# Patient Record
Sex: FEMALE | Race: WHITE | NOT HISPANIC OR LATINO | Employment: OTHER | URBAN - NONMETROPOLITAN AREA
[De-identification: names, ages, dates, MRNs, and addresses within clinical notes are randomized per-mention and may not be internally consistent; named-entity substitution may affect disease eponyms.]

---

## 2017-03-07 ENCOUNTER — OFFICE VISIT (OUTPATIENT)
Dept: URGENT CARE | Facility: PHYSICIAN GROUP | Age: 27
End: 2017-03-07
Payer: MEDICAID

## 2017-03-07 VITALS
OXYGEN SATURATION: 96 % | WEIGHT: 122 LBS | SYSTOLIC BLOOD PRESSURE: 110 MMHG | RESPIRATION RATE: 14 BRPM | HEIGHT: 68 IN | DIASTOLIC BLOOD PRESSURE: 70 MMHG | TEMPERATURE: 99 F | HEART RATE: 80 BPM | BODY MASS INDEX: 18.49 KG/M2

## 2017-03-07 DIAGNOSIS — R10.9 FLANK PAIN: ICD-10-CM

## 2017-03-07 DIAGNOSIS — N23 RENAL COLIC ON LEFT SIDE: ICD-10-CM

## 2017-03-07 LAB
APPEARANCE UR: NORMAL
BILIRUB UR STRIP-MCNC: NORMAL MG/DL
COLOR UR AUTO: NORMAL
GLUCOSE UR STRIP.AUTO-MCNC: NORMAL MG/DL
INT CON NEG: NEGATIVE
INT CON POS: POSITIVE
KETONES UR STRIP.AUTO-MCNC: NORMAL MG/DL
LEUKOCYTE ESTERASE UR QL STRIP.AUTO: NORMAL
NITRITE UR QL STRIP.AUTO: NORMAL
PH UR STRIP.AUTO: 8 [PH] (ref 5–8)
POC URINE PREGNANCY TEST: NEGATIVE
PROT UR QL STRIP: NORMAL MG/DL
RBC UR QL AUTO: NORMAL
SP GR UR STRIP.AUTO: 1
UROBILINOGEN UR STRIP-MCNC: NORMAL MG/DL

## 2017-03-07 PROCEDURE — 99203 OFFICE O/P NEW LOW 30 MIN: CPT | Performed by: PHYSICIAN ASSISTANT

## 2017-03-07 PROCEDURE — 81002 URINALYSIS NONAUTO W/O SCOPE: CPT | Performed by: PHYSICIAN ASSISTANT

## 2017-03-07 PROCEDURE — 81025 URINE PREGNANCY TEST: CPT | Performed by: PHYSICIAN ASSISTANT

## 2017-03-07 RX ORDER — TAMSULOSIN HYDROCHLORIDE 0.4 MG/1
0.4 CAPSULE ORAL
Qty: 20 CAP | Refills: 0 | Status: SHIPPED | OUTPATIENT
Start: 2017-03-07 | End: 2018-12-27

## 2017-03-07 RX ORDER — DICLOFENAC SODIUM 75 MG/1
75 TABLET, DELAYED RELEASE ORAL 2 TIMES DAILY
Qty: 60 TAB | Refills: 0 | Status: SHIPPED | OUTPATIENT
Start: 2017-03-07 | End: 2018-12-27

## 2017-03-07 RX ORDER — PHENAZOPYRIDINE HYDROCHLORIDE 200 MG/1
200 TABLET, FILM COATED ORAL 3 TIMES DAILY PRN
Qty: 6 TAB | Refills: 0 | Status: SHIPPED | OUTPATIENT
Start: 2017-03-07 | End: 2018-12-27

## 2017-03-08 ENCOUNTER — OFFICE VISIT (OUTPATIENT)
Dept: URGENT CARE | Facility: PHYSICIAN GROUP | Age: 27
End: 2017-03-08
Payer: MEDICAID

## 2017-03-08 VITALS
SYSTOLIC BLOOD PRESSURE: 116 MMHG | HEART RATE: 88 BPM | HEIGHT: 68 IN | RESPIRATION RATE: 14 BRPM | WEIGHT: 122 LBS | DIASTOLIC BLOOD PRESSURE: 74 MMHG | OXYGEN SATURATION: 98 % | TEMPERATURE: 97.6 F | BODY MASS INDEX: 18.49 KG/M2

## 2017-03-08 DIAGNOSIS — R11.0 NAUSEA: ICD-10-CM

## 2017-03-08 DIAGNOSIS — R31.9 HEMATURIA: ICD-10-CM

## 2017-03-08 DIAGNOSIS — Z87.442 HISTORY OF RENAL STONE: ICD-10-CM

## 2017-03-08 DIAGNOSIS — R10.9 LEFT FLANK PAIN: ICD-10-CM

## 2017-03-08 PROCEDURE — 99214 OFFICE O/P EST MOD 30 MIN: CPT | Performed by: PHYSICIAN ASSISTANT

## 2017-03-08 RX ORDER — OXYCODONE HYDROCHLORIDE AND ACETAMINOPHEN 5; 325 MG/1; MG/1
1 TABLET ORAL EVERY 6 HOURS PRN
Qty: 15 TAB | Refills: 0 | Status: SHIPPED | OUTPATIENT
Start: 2017-03-08 | End: 2017-03-23

## 2017-03-08 RX ORDER — ONDANSETRON 4 MG/1
4 TABLET, ORALLY DISINTEGRATING ORAL EVERY 8 HOURS PRN
Qty: 20 TAB | Refills: 0 | Status: SHIPPED | OUTPATIENT
Start: 2017-03-08 | End: 2018-12-27

## 2017-03-08 NOTE — MR AVS SNAPSHOT
"        Lucy Gonzalez   3/8/2017 6:15 PM   Office Visit   MRN: [id number]    Department:  [facility]   Dept Phone:  [phone]    Description:  Female : 1990   Provider:  Crystal Flores PA-C           Reason for Visit     Flank Pain           Allergies as of 3/8/2017     Allergen Noted Reactions    Bactrim Ds 2017         You were diagnosed with     Left flank pain   [763200]       Hematuria   [3779597]       History of renal stone   [190158]       Nausea   [955574]         Vital Signs     Blood Pressure Pulse Temperature Respirations Height Weight    116/74 mmHg 88 36.4 °C (97.6 °F) 14 1.727 m (5' 8\") 55.339 kg (122 lb)    Body Mass Index Oxygen Saturation Smoking Status             18.55 kg/m2 98% Never Smoker          Basic Information     Date Of Birth Sex Race Ethnicity Preferred Language    1990 Female Unable to Obtain Unknown English      Health Maintenance        Date Due Completion Dates    IMM HEP B VACCINE (1 of 3 - Primary Series) 1990 ---    IMM HEP A VACCINE (1 of 2 - Standard Series) 3/18/1992 ---    IMM HPV VACCINE (1 of 3 - Female 3 Dose Series) 3/18/2002 ---    IMM VARICELLA (CHICKENPOX) VACCINE (1 of 2 - 2 Dose Adolescent Series) 3/18/2004 ---    IMM DTaP/Tdap/Td Vaccine (1 - Tdap) 3/18/2010 ---    PAP SMEAR 3/18/2012 ---    IMM INFLUENZA (1) 2016 ---            Current Immunizations     No immunizations on file.      Below and/or attached are the medications your provider expects you to take. Review all of your home medications and newly ordered medications with your provider and/or pharmacist. Follow medication instructions as directed by your provider and/or pharmacist. Please keep your medication list with you and share with your provider. Update the information when medications are discontinued, doses are changed, or new medications (including over-the-counter products) are added; and carry medication information at all times in the event of emergency " situations     Allergies:  BACTRIM DS - (reactions not documented)               Medications  Valid as of: March 08, 2017 -  6:43 PM    Generic Name Brand Name Tablet Size Instructions for use    Diclofenac Sodium (Tablet Delayed Response) VOLTAREN 75 MG Take 1 Tab by mouth 2 times a day.        Ondansetron (TABLET DISPERSIBLE) ZOFRAN ODT 4 MG Take 1 Tab by mouth every 8 hours as needed for Nausea/Vomiting.        Oxycodone-Acetaminophen (Tab) PERCOCET 5-325 MG Take 1 Tab by mouth every 6 hours as needed for Moderate Pain or Severe Pain.        Phenazopyridine HCl (Tab) PYRIDIUM 200 MG Take 1 Tab by mouth 3 times a day as needed.        Tamsulosin HCl (Cap) FLOMAX 0.4 MG Take 1 Cap by mouth ONE-HALF HOUR AFTER BREAKFAST.        .                 Medicines prescribed today were sent to:     [facility] [id number] [place] [address], [place] - [address] [facility] [address] [place] [address] & [place]    [address] [facility] [address]    Phone: [phone] Fax: [phone]    Open 24 Hours?: No      Medication refill instructions:       If your prescription bottle indicates you have medication refills left, it is not necessary to call your provider’s office. Please contact your pharmacy and they will refill your medication.    If your prescription bottle indicates you do not have any refills left, you may request refills at any time through one of the following ways: The online [employer] system (except Urgent Care), by calling your provider’s office, or by asking your pharmacy to contact your provider’s office with a refill request. Medication refills are processed only during regular business hours and may not be available until the next business day. Your provider may request additional information or to have a follow-up visit with you prior to refilling your medication.   *Please Note: Medication refills are assigned a new Rx number when refilled electronically. Your pharmacy may indicate that no refills were  authorized even though a new prescription for the same medication is available at the pharmacy. Please request the medicine by name with the pharmacy before contacting your provider for a refill.        Your To Do List     Future Labs/Procedures Complete By Expires    CT-ABDOMEN-PELVIS W/O  As directed 3/8/2018      Referral     A referral request has been sent to our patient care coordination department. Please allow 3-5 business days for us to process this request and contact you either by phone or mail. If you do not hear from us by the 5th business day, please call us at [phone].           Topicmarks Access Code: [email or web address]  Expires: 4/7/2017  6:43 PM    Your email address is not on file at [facility].  Email Addresses are required for you to sign up for Topicmarks, please contact [phone] to verify your personal information and to provide your email address prior to attempting to register for [place] [address]    Topicmarks  A secure, online tool to manage your health information     [facility]’s Topicmarks® is a secure, online tool that connects you to your personalized health information from the privacy of your home -- day or night - making it very easy for you to manage your healthcare. Once the activation process is completed, you can even access your medical information using the Topicmarks stepan, which is available for free in the Apple Stepan store or Google Play store.     To learn more about Topicmarks, visit [email or web address]    There are two levels of access available (as shown below):   My Chart Features  [facility] Primary Care Doctor [facility]  Specialists [facility]  Urgent  Care Non-[facility] Primary Care Doctor   Email your healthcare team securely and privately 24/7 X X X    Manage appointments: schedule your next appointment; view details of past/upcoming appointments X      Request prescription refills. X      View recent personal medical records, including  lab and immunizations X X X X   View health record, including health history, allergies, medications X X X X   Read reports about your outpatient visits, procedures, consult and ER notes X X X X   See your discharge summary, which is a recap of your hospital and/or ER visit that includes your diagnosis, lab results, and care plan X X  X     How to register for [employer]:  Once your e-mail address has been verified, follow the following steps to sign up for [employer].     1. Go to  [email or web address]  2. Click on the Sign Up Now box, which takes you to the New Member Sign Up page. You will need to provide the following information:  a. Enter your [employer] Access Code exactly as it appears at the top of this page. (You will not need to use this code after you’ve completed the sign-up process. If you do not sign up before the expiration date, you must request a new code.)   b. Enter your date of birth.   c. Enter your home email address.   d. Click Submit, and follow the next screen’s instructions.  3. Create a BiOWiSHt ID. This will be your Cinedigm login ID and cannot be changed, so think of one that is secure and easy to remember.  4. Create a [employer]inedigm password. You can change your password at any time.  5. Enter your Password Reset Question and Answer. This can be used at a later time if you forget your password.   6. Enter your e-mail address. This allows you to receive e-mail notifications when new information is available in [employer]inedigm.  7. Click Sign Up. You can now view your health information.    For assistance activating your [employer] account, call [phone]

## 2017-03-08 NOTE — PROGRESS NOTES
Chief Complaint   Patient presents with   • Dysuria     possible stone       HISTORY OF PRESENT ILLNESS: Patient is a 26 y.o. female who presents today because she has left flank pain that has been going on for several days. She has a history of stones, passed a stone today that gave her a lot of pain. She has the stone in a Ziploc bag. Unless she denies any fevers, chills, nausea, vomiting or diarrhea. She has been taking some Tylenol for her symptoms, and that is all. She states this feels like her bladder is having spasms    There are no active problems to display for this patient.      Allergies:Bactrim ds    Current Outpatient Prescriptions Ordered in [facility]   Medication Sig Dispense Refill   • tamsulosin (FLOMAX) 0.4 MG capsule Take 1 Cap by mouth ONE-HALF HOUR AFTER BREAKFAST. 20 Cap 0   • diclofenac EC (VOLTAREN) 75 MG Tablet Delayed Response Take 1 Tab by mouth 2 times a day. 60 Tab 0   • phenazopyridine (PYRIDIUM) 200 MG Tab Take 1 Tab by mouth 3 times a day as needed. 6 Tab 0     No current Epic-ordered facility-administered medications on file.       No past medical history on file.    Social History   Substance Use Topics   • Smoking status: Never Smoker    • Smokeless tobacco: None   • Alcohol Use: None       No family status information on file.   No family history on file.    ROS:  Review of Systems   Constitutional: Negative for fever, chills, weight loss and malaise/fatigue.   HENT: Negative for ear pain, nosebleeds, congestion, sore throat and neck pain.    Eyes: Negative for blurred vision.   Respiratory: Negative for cough, sputum production, shortness of breath and wheezing.    Cardiovascular: Negative for chest pain, palpitations, orthopnea and leg swelling.   Gastrointestinal: Negative for heartburn, nausea, vomiting and positive for left flank and lower abdominal pain.   Genitourinary: Negative for dysuria, urgency and frequency.     Exam:  Blood pressure 110/70, pulse 80, temperature 37.2 °C  "(99 °F), resp. rate 14, height 1.727 m (5' 8\"), weight 55.339 kg (122 lb), SpO2 96 %.  General:  Well nourished, well developed female in NAD  Head:Normocephalic, atraumatic  Eyes: PERRLA, EOM within normal limits, no conjunctival injection, no scleral icterus, visual fields and acuity grossly intact.  Abdomen: Nondistended, bowel tones in all 4 quadrants, soft, she has some suprapubic tenderness to palpation, also tenderness to palpation in the left CVA area. No organomegaly, rebound, referred rebound tenderness.  Extremities: no clubbing, cyanosis, or edema.    Urinalysis shows a moderate amount of nonhemolyzed blood    HCG negative    Please note that this dictation was created using voice recognition software. I have made every reasonable attempt to correct obvious errors, but I expect that there are errors of grammar and possibly content that I did not discover before finalizing the note.    Assessment/Plan:  1. Flank pain  POCT Urinalysis   2. Renal colic on left side  POCT PREGNANCY    tamsulosin (FLOMAX) 0.4 MG capsule    diclofenac EC (VOLTAREN) 75 MG Tablet Delayed Response    phenazopyridine (PYRIDIUM) 200 MG Tab    , increase by mouth fluids    Followup with primary care in the next 7-10 days if not significantly improving, return to the urgent care or go to the emergency room sooner for any worsening of symptoms.         "

## 2017-03-09 NOTE — PROGRESS NOTES
Chief Complaint   Patient presents with   • Flank Pain       HISTORY OF PRESENT ILLNESS: Patient is a 26 y.o. female who presents today for worsening left flank pain. Patient was seen yesterday for the same. She had gross hematuria in her urine without any evidence of infection. She was put on diclofenac, Pyridium, and Flomax. She states that earlier today she had severe left flank pain that dropped her to her knees with associated nausea and vomiting. She has a history of renal stones and was seeing urology in [place] before relocating here. She reports persistent dysuria and left flank pain. She does not have a plan with her primary care provider until the end of the month. The medication that she was given is not helping.    There are no active problems to display for this patient.      Allergies:Bactrim ds    Current Outpatient Prescriptions Ordered in [facility]   Medication Sig Dispense Refill   • oxycodone-acetaminophen (PERCOCET) 5-325 MG Tab Take 1 Tab by mouth every 6 hours as needed for Moderate Pain or Severe Pain. 15 Tab 0   • ondansetron (ZOFRAN ODT) 4 MG TABLET DISPERSIBLE Take 1 Tab by mouth every 8 hours as needed for Nausea/Vomiting. 20 Tab 0   • tamsulosin (FLOMAX) 0.4 MG capsule Take 1 Cap by mouth ONE-HALF HOUR AFTER BREAKFAST. 20 Cap 0   • diclofenac EC (VOLTAREN) 75 MG Tablet Delayed Response Take 1 Tab by mouth 2 times a day. 60 Tab 0   • phenazopyridine (PYRIDIUM) 200 MG Tab Take 1 Tab by mouth 3 times a day as needed. 6 Tab 0     No current Epic-ordered facility-administered medications on file.       No past medical history on file.    Social History   Substance Use Topics   • Smoking status: Never Smoker    • Smokeless tobacco: Not on file   • Alcohol Use: Not on file       No family status information on file.   No family history on file.    ROS:   Review of Systems   Constitutional: Negative for fever, chills, weight loss and malaise/fatigue.   HENT: Negative for ear pain, nosebleeds,  "congestion, sore throat and neck pain.    Eyes: Negative for blurred vision.   Respiratory: Negative for cough, sputum production, shortness of breath and wheezing.    Cardiovascular: Negative for chest pain, palpitations, orthopnea and leg swelling.   Gastrointestinal: Negative for heartburn  and abdominal pain.   Genitourinary: Negative for dysuria, urgency and frequency.       Exam:  Blood pressure 116/74, pulse 88, temperature 36.4 °C (97.6 °F), resp. rate 14, height 1.727 m (5' 8\"), weight 55.339 kg (122 lb), SpO2 98 %.  General: Normal appearing. No distress. Patient does not appear to be in a tremendous amount of pain at this time.  HEENT:  Head is grossly normal.  Pulmonary: Clear to ausculation and percussion.  Normal effort. No rales, ronchi, or wheezing.   Cardiovascular: Regular rate and rhythm without murmur.    Back: Very mild CVA tenderness.  Neurologic: Grossly nonfocal.  Lymph: No cervical lymphadenopathy noted.  Skin: No obvious lesions.  Psych: Normal mood. Alert and oriented x3. Judgment and insight is normal.    Urine pregnancy was negative yesterday    Assessment/Plan:  Patient to schedule an outpatient CT for evaluation of her renal stones. Take all medications as directed. Discussed potential side effects of the Percocet including but not limited to dizziness, nausea, and constipation. Drink plenty of fluids. Discussed ER precautions. Referred patient to urology per patient request.  1. Left flank pain  CT-ABDOMEN-PELVIS W/O    oxycodone-acetaminophen (PERCOCET) 5-325 MG Tab    REFERRAL TO UROLOGY   2. Hematuria  CT-ABDOMEN-PELVIS W/O    REFERRAL TO UROLOGY   3. History of renal stone  CT-ABDOMEN-PELVIS W/O    oxycodone-acetaminophen (PERCOCET) 5-325 MG Tab    REFERRAL TO UROLOGY   4. Nausea  ondansetron (ZOFRAN ODT) 4 MG TABLET DISPERSIBLE    REFERRAL TO UROLOGY         "

## 2017-03-23 ENCOUNTER — OFFICE VISIT (OUTPATIENT)
Dept: URGENT CARE | Facility: PHYSICIAN GROUP | Age: 27
End: 2017-03-23
Payer: MEDICAID

## 2017-03-23 VITALS
OXYGEN SATURATION: 100 % | WEIGHT: 118 LBS | DIASTOLIC BLOOD PRESSURE: 80 MMHG | RESPIRATION RATE: 20 BRPM | TEMPERATURE: 97.9 F | SYSTOLIC BLOOD PRESSURE: 140 MMHG | BODY MASS INDEX: 17.95 KG/M2 | HEART RATE: 102 BPM

## 2017-03-23 DIAGNOSIS — N23 RENAL COLIC ON LEFT SIDE: ICD-10-CM

## 2017-03-23 LAB
APPEARANCE UR: CLEAR
BILIRUB UR STRIP-MCNC: NORMAL MG/DL
COLOR UR AUTO: YELLOW
GLUCOSE UR STRIP.AUTO-MCNC: NORMAL MG/DL
KETONES UR STRIP.AUTO-MCNC: NORMAL MG/DL
LEUKOCYTE ESTERASE UR QL STRIP.AUTO: NORMAL
NITRITE UR QL STRIP.AUTO: NORMAL
PH UR STRIP.AUTO: 7.5 [PH] (ref 5–8)
PROT UR QL STRIP: NORMAL MG/DL
RBC UR QL AUTO: NORMAL
SP GR UR STRIP.AUTO: 1.01
UROBILINOGEN UR STRIP-MCNC: NORMAL MG/DL

## 2017-03-23 PROCEDURE — 81002 URINALYSIS NONAUTO W/O SCOPE: CPT | Performed by: PHYSICIAN ASSISTANT

## 2017-03-23 PROCEDURE — 99214 OFFICE O/P EST MOD 30 MIN: CPT | Performed by: PHYSICIAN ASSISTANT

## 2017-03-23 RX ORDER — OXYCODONE HYDROCHLORIDE AND ACETAMINOPHEN 5; 325 MG/1; MG/1
1-2 TABLET ORAL EVERY 4 HOURS PRN
Qty: 15 TAB | Refills: 0 | Status: SHIPPED | OUTPATIENT
Start: 2017-03-23 | End: 2018-12-27

## 2017-03-23 ASSESSMENT — ENCOUNTER SYMPTOMS
NAUSEA: 0
VOMITING: 0
DIZZINESS: 0
CHILLS: 0
MUSCULOSKELETAL NEGATIVE: 1
SHORTNESS OF BREATH: 0
FLANK PAIN: 1
DIARRHEA: 0
FEVER: 0
ABDOMINAL PAIN: 0

## 2017-03-23 NOTE — MR AVS SNAPSHOT
Lucy Gonzalez   3/23/2017 10:55 AM   Office Visit   MRN: [id number]    Department:  [facility]   Dept Phone:  [phone]    Description:  Female : 1990   Provider:  Dorothea Simon PA-C           Reason for Visit     Flank Pain           Allergies as of 3/23/2017     Allergen Noted Reactions    Bactrim Ds 2017         You were diagnosed with     Renal colic on left side   [263387]         Vital Signs     Blood Pressure Pulse Temperature Respirations Weight Oxygen Saturation    140/80 mmHg 102 36.6 °C (97.9 °F) 20 53.524 kg (118 lb) 100%    Smoking Status                   Never Smoker            Basic Information     Date Of Birth Sex Race Ethnicity Preferred Language    1990 Female Unable to Obtain Unknown English      Health Maintenance        Date Due Completion Dates    IMM HEP B VACCINE (1 of 3 - Primary Series) 1991 ---    IMM HEP A VACCINE (1 of 2 - Standard Series) 3/18/1992 ---    IMM HPV VACCINE (1 of 3 - Female 3 Dose Series) 3/18/2002 ---    IMM VARICELLA (CHICKENPOX) VACCINE (1 of 2 - 2 Dose Adolescent Series) 3/18/2004 ---    IMM DTaP/Tdap/Td Vaccine (1 - Tdap) 3/18/2010 ---    PAP SMEAR 3/18/2012 ---    IMM INFLUENZA (1) 2016 ---            Results     POCT Urinalysis      Component Value Standard Range & Units    POC Color Yellow Negative    POC Appearance clear Negative    POC Leukocyte Esterase neg Negative    POC Nitrites neg Negative    POC Urobiligen neg Negative (0.2) mg/dL    POC Protein neg Negative mg/dL    POC Urine PH 7.5 5.0 - 8.0    POC Blood neg Negative    POC Specific Gravity 1.015 <1.005 - >1.030    POC Ketones neg Negative mg/dL    POC Biliruben neg Negative mg/dL    POC Glucose neg Negative mg/dL                        Current Immunizations     No immunizations on file.      Below and/or attached are the medications your provider expects you to take. Review all of your home medications and newly ordered medications with your provider and/or  pharmacist. Follow medication instructions as directed by your provider and/or pharmacist. Please keep your medication list with you and share with your provider. Update the information when medications are discontinued, doses are changed, or new medications (including over-the-counter products) are added; and carry medication information at all times in the event of emergency situations     Allergies:  BACTRIM DS - (reactions not documented)               Medications  Valid as of: March 23, 2017 - 11:42 AM    Generic Name Brand Name Tablet Size Instructions for use    Diclofenac Sodium (Tablet Delayed Response) VOLTAREN 75 MG Take 1 Tab by mouth 2 times a day.        Ondansetron (TABLET DISPERSIBLE) ZOFRAN ODT 4 MG Take 1 Tab by mouth every 8 hours as needed for Nausea/Vomiting.        Oxycodone-Acetaminophen (Tab) PERCOCET 5-325 MG Take 1-2 Tabs by mouth every four hours as needed.        Phenazopyridine HCl (Tab) PYRIDIUM 200 MG Take 1 Tab by mouth 3 times a day as needed.        Tamsulosin HCl (Cap) FLOMAX 0.4 MG Take 1 Cap by mouth ONE-HALF HOUR AFTER BREAKFAST.        .                 Medicines prescribed today were sent to:     [email or web address] [address] [place] [address] [address] [facility] [address] [place] [address] & [place]    [address] [facility] [address] [facility] [address]    Phone: [phone] Fax: [phone]    Open 24 Hours?: No      Medication refill instructions:       If your prescription bottle indicates you have medication refills left, it is not necessary to call your provider’s office. Please contact your pharmacy and they will refill your medication.    If your prescription bottle indicates you do not have any refills left, you may request refills at any time through one of the following ways: The online [employer] system (except Urgent Care), by calling your provider’s office, or by asking your pharmacy to contact your provider’s office with a refill request. Medication refills are processed only  during regular business hours and may not be available until the next business day. Your provider may request additional information or to have a follow-up visit with you prior to refilling your medication.   *Please Note: Medication refills are assigned a new Rx number when refilled electronically. Your pharmacy may indicate that no refills were authorized even though a new prescription for the same medication is available at the pharmacy. Please request the medicine by name with the pharmacy before contacting your provider for a refill.        Your To Do List     Future Labs/Procedures Complete By Expires    CBC WITH DIFFERENTIAL  As directed 3/23/2018    COMP METABOLIC PANEL  As directed 3/23/2018         [employer] Access Code: [email or web address]  Expires: 4/7/2017  7:43 PM    Your email address is not on file at [facility].  Email Addresses are required for you to sign up for [employer], please contact [phone] to verify your personal information and to provide your email address prior to attempting to register for [employer] [place] [address]    [employer]  A secure, online tool to manage your health information     [facility]’s [employer]® is a secure, online tool that connects you to your personalized health information from the privacy of your home -- day or night - making it very easy for you to manage your healthcare. Once the activation process is completed, you can even access your medical information using the [employer] stepan, which is available for free in the Apple Stepan store or Google Play store.     To learn more about [employer], visit [email or web address]    There are two levels of access available (as shown below):   My Chart Features  [facility] Primary Care Doctor [facility]  Specialists [facility]  Urgent  Care Non-[facility] Primary Care Doctor   Email your healthcare team securely and privately 24/7 X X X    Manage appointments: schedule your next appointment; view details of  past/upcoming appointments X      Request prescription refills. X      View recent personal medical records, including lab and immunizations X X X X   View health record, including health history, allergies, medications X X X X   Read reports about your outpatient visits, procedures, consult and ER notes X X X X   See your discharge summary, which is a recap of your hospital and/or ER visit that includes your diagnosis, lab results, and care plan X X  X     How to register for Space[employer]:  Once your e-mail address has been verified, follow the following steps to sign up for Spacebar.     1. Go to  [email or web address]  2. Click on the Sign Up Now box, which takes you to the New Member Sign Up page. You will need to provide the following information:  a. Enter your Spacebar Access Code exactly as it appears at the top of this page. (You will not need to use this code after you’ve completed the sign-up process. If you do not sign up before the expiration date, you must request a new code.)   b. Enter your date of birth.   c. Enter your home email address.   d. Click Submit, and follow the next screen’s instructions.  3. Create a Spacebar ID. This will be your Spacebar login ID and cannot be changed, so think of one that is secure and easy to remember.  4. Create a Spacebar password. You can change your password at any time.  5. Enter your Password Reset Question and Answer. This can be used at a later time if you forget your password.   6. Enter your e-mail address. This allows you to receive e-mail notifications when new information is available in Spacebar.  7. Click Sign Up. You can now view your health information.    For assistance activating your Spacebar account, call [phone]

## 2017-03-23 NOTE — PROGRESS NOTES
Subjective:      Lucy Gonzalez is a 27 y.o. female who presents with Flank Pain            HPI  Patient was first seen for this problem about 2 weeks ago. She was having severe left sided flank pain causing nausea and vomiting, gross hematuria, and passage of a stone that she brought into urgent care. She has a history of kidney stones, and was seeing a urologist before she moved here from [place]. She states she felt better for about a week after she was initially seen here, but over the past couple days her symptoms have returned last night, and were very severe last night. She did notice blood in her urine yesterday.     Review of Systems   Constitutional: Negative for fever and chills.   Respiratory: Negative for shortness of breath.    Cardiovascular: Negative for chest pain.   Gastrointestinal: Negative for nausea, vomiting, abdominal pain and diarrhea.   Genitourinary: Positive for hematuria and flank pain. Negative for urgency and frequency.   Musculoskeletal: Negative.    Neurological: Negative for dizziness.          Objective:     /80 mmHg  Pulse 102  Temp(Src) 36.6 °C (97.9 °F)  Resp 20  Wt 53.524 kg (118 lb)  SpO2 100%     Physical Exam   Constitutional: She is oriented to person, place, and time. She appears well-developed and well-nourished. She appears distressed.   HENT:   Head: Normocephalic and atraumatic.   Eyes: Conjunctivae are normal. Pupils are equal, round, and reactive to light.   Neck: Normal range of motion.   Cardiovascular: Normal rate, regular rhythm and normal heart sounds.    No murmur heard.  Pulmonary/Chest: Effort normal.   Abdominal: Soft. Normal appearance and bowel sounds are normal. There is tenderness in the left lower quadrant.       LLQ pain with palpation that radiates up towards LUQ and posteriorly   Genitourinary:   No CVAT bilaterally   Musculoskeletal: Normal range of motion.   Neurological: She is alert and oriented to person, place, and time.   Skin: Skin  is warm and dry. She is not diaphoretic.   Psychiatric: She has a normal mood and affect. Her behavior is normal.   Nursing note and vitals reviewed.            POCT Urinalysis:   Component Value Ref Range & Units Status      POC Color Yellow Negative Final     POC Appearance clear Negative Final     POC Leukocyte Esterase neg Negative Final     POC Nitrites neg Negative Final     POC Urobiligen neg Negative (0.2) mg/dL Final     POC Protein neg Negative mg/dL Final     POC Urine PH 7.5 5.0 - 8.0 Final     POC Blood neg Negative Final     POC Specific Gravity 1.015 <1.005 - >1.030 Final     POC Ketones neg Negative mg/dL Final     POC Biliruben neg Negative mg/dL Final     POC Glucose neg Negative mg/dL Final         Last Resulted Time     Thu Mar 23, 2017 11:36 AM       Assessment/Plan:     1. Renal colic on left side  - Patient has been having ongoing problems with left sided renal colic for about 2 weeks now. Worsened last night then improved this morning. She already has an appointment with a urologist scheduled for next Wednesday (6 days from now). I will get blood work to mainly check calcium and uric acid levels for he to have when she sees the specialist.   - POCT Urinalysis --> normal, no blood  - CBC WITH DIFFERENTIAL; Future  - COMP METABOLIC PANEL; Future  - URIC ACID, SERUM  - oxycodone-acetaminophen (PERCOCET) 5-325 MG Tab; Take 1-2 Tabs by mouth every four hours as needed.  Dispense: 15 Tab; Refill: 0   - Will cause sedation, avoid driving, operating heavy machinery, and drinking alcohol   - Do not breastfeed while taking painkillers    [place]  Aware web site evaluation: I have obtained and reviewed patient utilization report from [facility] pharmacy database prior to writing prescription for controlled substance II, III or IV per [place] bill . Based on the report and my clinical assessment the prescription is medically necessary.

## 2017-03-24 ENCOUNTER — HOSPITAL ENCOUNTER (OUTPATIENT)
Dept: LAB | Facility: MEDICAL CENTER | Age: 27
End: 2017-03-24
Attending: PHYSICIAN ASSISTANT
Payer: MEDICAID

## 2017-03-24 DIAGNOSIS — N23 RENAL COLIC ON LEFT SIDE: ICD-10-CM

## 2017-03-24 LAB
ALBUMIN SERPL BCP-MCNC: 4.5 G/DL (ref 3.2–4.9)
ALBUMIN/GLOB SERPL: 1.5 G/DL
ALP SERPL-CCNC: 66 U/L (ref 30–99)
ALT SERPL-CCNC: 15 U/L (ref 2–50)
ANION GAP SERPL CALC-SCNC: 8 MMOL/L (ref 0–11.9)
AST SERPL-CCNC: 14 U/L (ref 12–45)
BASOPHILS # BLD AUTO: 0.04 K/UL (ref 0–0.12)
BASOPHILS NFR BLD AUTO: 0.7 % (ref 0–1.8)
BILIRUB SERPL-MCNC: 0.9 MG/DL (ref 0.1–1.5)
BUN SERPL-MCNC: 17 MG/DL (ref 8–22)
CALCIUM SERPL-MCNC: 9.6 MG/DL (ref 8.5–10.5)
CHLORIDE SERPL-SCNC: 103 MMOL/L (ref 96–112)
CO2 SERPL-SCNC: 28 MMOL/L (ref 20–33)
CREAT SERPL-MCNC: 0.85 MG/DL (ref 0.5–1.4)
EOSINOPHIL # BLD: 0.11 K/UL (ref 0–0.51)
EOSINOPHIL NFR BLD AUTO: 1.9 % (ref 0–6.9)
ERYTHROCYTE [DISTWIDTH] IN BLOOD BY AUTOMATED COUNT: 41.5 FL (ref 35.9–50)
GLOBULIN SER CALC-MCNC: 3.1 G/DL (ref 1.9–3.5)
GLUCOSE SERPL-MCNC: 74 MG/DL (ref 65–99)
HCT VFR BLD AUTO: 44.4 % (ref 37–47)
HGB BLD-MCNC: 14.3 G/DL (ref 12–16)
IMM GRANULOCYTES # BLD AUTO: 0.01 K/UL (ref 0–0.11)
IMM GRANULOCYTES NFR BLD AUTO: 0.2 % (ref 0–0.9)
LYMPHOCYTES # BLD: 2.76 K/UL (ref 1–4.8)
LYMPHOCYTES NFR BLD AUTO: 46.5 % (ref 22–41)
MCH RBC QN AUTO: 31.2 PG (ref 27–33)
MCHC RBC AUTO-ENTMCNC: 32.2 G/DL (ref 33.6–35)
MCV RBC AUTO: 96.7 FL (ref 81.4–97.8)
MONOCYTES # BLD: 0.32 K/UL (ref 0–0.85)
MONOCYTES NFR BLD AUTO: 5.4 % (ref 0–13.4)
NEUTROPHILS # BLD: 2.69 K/UL (ref 2–7.15)
NEUTROPHILS NFR BLD AUTO: 45.3 % (ref 44–72)
NRBC # BLD AUTO: 0 K/UL
NRBC BLD-RTO: 0 /100 WBC
PLATELET # BLD AUTO: 285 K/UL (ref 164–446)
PMV BLD AUTO: 9.3 FL (ref 9–12.9)
POTASSIUM SERPL-SCNC: 4.1 MMOL/L (ref 3.6–5.5)
PROT SERPL-MCNC: 7.6 G/DL (ref 6–8.2)
RBC # BLD AUTO: 4.59 M/UL (ref 4.2–5.4)
SODIUM SERPL-SCNC: 139 MMOL/L (ref 135–145)
URATE SERPL-MCNC: 5 MG/DL (ref 1.9–8.2)
WBC # BLD AUTO: 5.9 K/UL (ref 4.8–10.8)

## 2017-03-24 PROCEDURE — 85025 COMPLETE CBC W/AUTO DIFF WBC: CPT

## 2017-03-24 PROCEDURE — 84550 ASSAY OF BLOOD/URIC ACID: CPT

## 2017-03-24 PROCEDURE — 80053 COMPREHEN METABOLIC PANEL: CPT

## 2017-03-24 PROCEDURE — 36415 COLL VENOUS BLD VENIPUNCTURE: CPT

## 2018-12-27 ENCOUNTER — APPOINTMENT (OUTPATIENT)
Dept: RADIOLOGY | Facility: MEDICAL CENTER | Age: 28
End: 2018-12-27
Payer: MEDICAID

## 2018-12-27 ENCOUNTER — HOSPITAL ENCOUNTER (EMERGENCY)
Facility: MEDICAL CENTER | Age: 28
End: 2018-12-28
Attending: EMERGENCY MEDICINE
Payer: MEDICAID

## 2018-12-27 ENCOUNTER — APPOINTMENT (OUTPATIENT)
Dept: RADIOLOGY | Facility: MEDICAL CENTER | Age: 28
End: 2018-12-27
Attending: EMERGENCY MEDICINE
Payer: MEDICAID

## 2018-12-27 ENCOUNTER — HOSPITAL ENCOUNTER (EMERGENCY)
Facility: MEDICAL CENTER | Age: 28
End: 2018-12-27
Payer: MEDICAID

## 2018-12-27 VITALS
TEMPERATURE: 98.7 F | RESPIRATION RATE: 16 BRPM | WEIGHT: 146.16 LBS | DIASTOLIC BLOOD PRESSURE: 69 MMHG | BODY MASS INDEX: 22.94 KG/M2 | HEART RATE: 96 BPM | HEIGHT: 67 IN | OXYGEN SATURATION: 95 % | SYSTOLIC BLOOD PRESSURE: 108 MMHG

## 2018-12-27 DIAGNOSIS — N20.0 PREGNANCY COMPLICATED BY NEPHROLITHIASIS IN SECOND TRIMESTER, ANTEPARTUM: ICD-10-CM

## 2018-12-27 DIAGNOSIS — O26.832 PREGNANCY COMPLICATED BY NEPHROLITHIASIS IN SECOND TRIMESTER, ANTEPARTUM: ICD-10-CM

## 2018-12-27 PROCEDURE — 85025 COMPLETE CBC W/AUTO DIFF WBC: CPT

## 2018-12-27 PROCEDURE — 96374 THER/PROPH/DIAG INJ IV PUSH: CPT

## 2018-12-27 PROCEDURE — 80053 COMPREHEN METABOLIC PANEL: CPT

## 2018-12-27 PROCEDURE — 99285 EMERGENCY DEPT VISIT HI MDM: CPT

## 2018-12-27 PROCEDURE — 96375 TX/PRO/DX INJ NEW DRUG ADDON: CPT

## 2018-12-27 PROCEDURE — 302449 STATCHG TRIAGE ONLY (STATISTIC)

## 2018-12-27 ASSESSMENT — PAIN SCALES - GENERAL: PAINLEVEL_OUTOF10: 0

## 2018-12-28 ENCOUNTER — APPOINTMENT (OUTPATIENT)
Dept: RADIOLOGY | Facility: MEDICAL CENTER | Age: 28
End: 2018-12-28
Attending: EMERGENCY MEDICINE
Payer: MEDICAID

## 2018-12-28 VITALS
HEART RATE: 89 BPM | DIASTOLIC BLOOD PRESSURE: 85 MMHG | SYSTOLIC BLOOD PRESSURE: 117 MMHG | OXYGEN SATURATION: 95 % | RESPIRATION RATE: 18 BRPM | BODY MASS INDEX: 22.12 KG/M2 | TEMPERATURE: 98.4 F | HEIGHT: 68 IN | WEIGHT: 145.94 LBS

## 2018-12-28 LAB
ALBUMIN SERPL BCP-MCNC: 3.4 G/DL (ref 3.2–4.9)
ALBUMIN/GLOB SERPL: 1 G/DL
ALP SERPL-CCNC: 50 U/L (ref 30–99)
ALT SERPL-CCNC: 9 U/L (ref 2–50)
ANION GAP SERPL CALC-SCNC: 8 MMOL/L (ref 0–11.9)
APPEARANCE UR: CLEAR
AST SERPL-CCNC: 15 U/L (ref 12–45)
BASOPHILS # BLD AUTO: 0.3 % (ref 0–1.8)
BASOPHILS # BLD: 0.02 K/UL (ref 0–0.12)
BILIRUB SERPL-MCNC: 0.8 MG/DL (ref 0.1–1.5)
BILIRUB UR QL STRIP.AUTO: NEGATIVE
BUN SERPL-MCNC: 12 MG/DL (ref 8–22)
CALCIUM SERPL-MCNC: 8.2 MG/DL (ref 8.4–10.2)
CHLORIDE SERPL-SCNC: 105 MMOL/L (ref 96–112)
CO2 SERPL-SCNC: 20 MMOL/L (ref 20–33)
COLOR UR: YELLOW
CREAT SERPL-MCNC: 0.57 MG/DL (ref 0.5–1.4)
EOSINOPHIL # BLD AUTO: 0.02 K/UL (ref 0–0.51)
EOSINOPHIL NFR BLD: 0.3 % (ref 0–6.9)
ERYTHROCYTE [DISTWIDTH] IN BLOOD BY AUTOMATED COUNT: 40.4 FL (ref 35.9–50)
GLOBULIN SER CALC-MCNC: 3.4 G/DL (ref 1.9–3.5)
GLUCOSE SERPL-MCNC: 109 MG/DL (ref 65–99)
GLUCOSE UR STRIP.AUTO-MCNC: NEGATIVE MG/DL
HCT VFR BLD AUTO: 39.5 % (ref 37–47)
HGB BLD-MCNC: 13.9 G/DL (ref 12–16)
IMM GRANULOCYTES # BLD AUTO: 0.05 K/UL (ref 0–0.11)
IMM GRANULOCYTES NFR BLD AUTO: 0.7 % (ref 0–0.9)
KETONES UR STRIP.AUTO-MCNC: NEGATIVE MG/DL
LEUKOCYTE ESTERASE UR QL STRIP.AUTO: NEGATIVE
LYMPHOCYTES # BLD AUTO: 0.7 K/UL (ref 1–4.8)
LYMPHOCYTES NFR BLD: 9.5 % (ref 22–41)
MCH RBC QN AUTO: 32.6 PG (ref 27–33)
MCHC RBC AUTO-ENTMCNC: 35.2 G/DL (ref 33.6–35)
MCV RBC AUTO: 92.7 FL (ref 81.4–97.8)
MICRO URNS: NORMAL
MONOCYTES # BLD AUTO: 0.36 K/UL (ref 0–0.85)
MONOCYTES NFR BLD AUTO: 4.9 % (ref 0–13.4)
NEUTROPHILS # BLD AUTO: 6.22 K/UL (ref 2–7.15)
NEUTROPHILS NFR BLD: 84.3 % (ref 44–72)
NITRITE UR QL STRIP.AUTO: NEGATIVE
NRBC # BLD AUTO: 0 K/UL
NRBC BLD-RTO: 0 /100 WBC
PH UR STRIP.AUTO: 6 [PH]
PLATELET # BLD AUTO: 196 K/UL (ref 164–446)
PMV BLD AUTO: 9.2 FL (ref 9–12.9)
POTASSIUM SERPL-SCNC: 3.5 MMOL/L (ref 3.6–5.5)
PROT SERPL-MCNC: 6.8 G/DL (ref 6–8.2)
PROT UR QL STRIP: NEGATIVE MG/DL
RBC # BLD AUTO: 4.26 M/UL (ref 4.2–5.4)
RBC UR QL AUTO: NEGATIVE
SODIUM SERPL-SCNC: 133 MMOL/L (ref 135–145)
SP GR UR REFRACTOMETRY: 1.03
WBC # BLD AUTO: 7.4 K/UL (ref 4.8–10.8)

## 2018-12-28 PROCEDURE — 96375 TX/PRO/DX INJ NEW DRUG ADDON: CPT

## 2018-12-28 PROCEDURE — 700111 HCHG RX REV CODE 636 W/ 250 OVERRIDE (IP): Performed by: EMERGENCY MEDICINE

## 2018-12-28 PROCEDURE — 96374 THER/PROPH/DIAG INJ IV PUSH: CPT

## 2018-12-28 PROCEDURE — 76775 US EXAM ABDO BACK WALL LIM: CPT

## 2018-12-28 PROCEDURE — 81003 URINALYSIS AUTO W/O SCOPE: CPT

## 2018-12-28 PROCEDURE — 36415 COLL VENOUS BLD VENIPUNCTURE: CPT

## 2018-12-28 PROCEDURE — 76815 OB US LIMITED FETUS(S): CPT

## 2018-12-28 RX ORDER — TAMSULOSIN HYDROCHLORIDE 0.4 MG/1
0.4 CAPSULE ORAL DAILY
Qty: 30 CAP | Refills: 0 | Status: SHIPPED | OUTPATIENT
Start: 2018-12-28 | End: 2021-12-24

## 2018-12-28 RX ORDER — ONDANSETRON 2 MG/ML
4 INJECTION INTRAMUSCULAR; INTRAVENOUS ONCE
Status: COMPLETED | OUTPATIENT
Start: 2018-12-28 | End: 2018-12-28

## 2018-12-28 RX ORDER — MORPHINE SULFATE 4 MG/ML
4 INJECTION, SOLUTION INTRAMUSCULAR; INTRAVENOUS ONCE
Status: COMPLETED | OUTPATIENT
Start: 2018-12-28 | End: 2018-12-28

## 2018-12-28 RX ORDER — TAMSULOSIN HYDROCHLORIDE 0.4 MG/1
0.4 CAPSULE ORAL DAILY
Qty: 30 CAP | Refills: 0 | Status: SHIPPED | OUTPATIENT
Start: 2018-12-28 | End: 2018-12-28

## 2018-12-28 RX ADMIN — MORPHINE SULFATE 4 MG: 4 INJECTION INTRAVENOUS at 00:21

## 2018-12-28 RX ADMIN — ONDANSETRON 4 MG: 2 INJECTION INTRAMUSCULAR; INTRAVENOUS at 00:22

## 2018-12-28 ASSESSMENT — ENCOUNTER SYMPTOMS
NAUSEA: 1
FLANK PAIN: 1
FEVER: 0
MYALGIAS: 0
VOMITING: 1
ABDOMINAL PAIN: 0
CHILLS: 0
DIZZINESS: 0

## 2018-12-28 NOTE — ED NOTES
Pt seen by ERP at the BS.    Pt given discharge paperwork and prescription, pt verbalized understanding all information given. Pt ambulated out of the ER w/o difficulty

## 2018-12-28 NOTE — ED NOTES
Pt decides to leave AMA because they thought it was ridiculous that being pregnant wasn't a priority. Pt signed the AMA and will be dismissed.

## 2018-12-28 NOTE — ED TRIAGE NOTES
"Chief Complaint   Patient presents with   • N/V   • Cramping   • Pregnancy     Pt ambulatory to triage with above complaint.  Pt states she is 18 weeks pregnant with second child.  Pt states that she has had N/V/D for past 24 hours with accompanying abdominal cramping.  Pt states her urine is slightly cloudy with \"some smell.\"    Pt returned to [facility], educated on triage process, and to inform staff of any changes or concerns.      Blood pressure 108/69, pulse 96, temperature 37.1 °C (98.7 °F), temperature source Temporal, resp. rate 16, height 1.702 m (5' 7\"), weight 66.3 kg (146 lb 2.6 oz), SpO2 95 %.        "

## 2018-12-28 NOTE — ED TRIAGE NOTES
"Chief Complaint   Patient presents with   • N/V     per pt last 12 hours she has had c/o n/v and has been unable to keep food or fluids down.    • Abdominal Cramping     per pt she has had intermittant cramping since this AM and back pain. pt is 18 weeks pregnant.     /85   Pulse (!) 108   Temp 36.5 °C (97.7 °F) (Temporal)   Resp 18   Ht 1.727 m (5' 8\")   Wt 66.2 kg (145 lb 15.1 oz)   SpO2 96%   BMI 22.19 kg/m²     Pt BIB family for N/V. Pt is almost 19 weeks pregnant and has been unable to eat or drink all day.   "

## 2018-12-28 NOTE — ED PROVIDER NOTES
ED Provider Note    CHIEF COMPLAINT  Chief Complaint   Patient presents with   • N/V     per pt last 12 hours she has had c/o n/v and has been unable to keep food or fluids down.    • Abdominal Cramping     per pt she has had intermittant cramping since this AM and back pain. pt is 18 weeks pregnant.       HPI  Lucy Gonzalez is a 28 y.o.  female Here with chief complaint of flank pain, and dysuria.  Patient is currently 18 weeks pregnant.  She had some migraines in her pregnancy and some mild nausea and first trimester but otherwise is been a completely uncomplicated pregnancy. Patient denies any vaginal bleeding or change in vaginal discharge. She denies any contractions or pelvic pain. Patient denies any fevers.  She reports vomiting for the last 2 days.  Patient with a history of kidney stones.  She reports she is having left flank pain that is constant with exacerbations of colicky pain as well similar to when she had kidney stones which complicated her last pregnancy.  Patient denies any right upper quadrant pain or hernán abdominal pain.  Patient is followed at [facility]    REVIEW OF SYSTEMS  Review of Systems   Constitutional: Negative for chills and fever.   Cardiovascular: Negative for chest pain.   Gastrointestinal: Positive for nausea and vomiting. Negative for abdominal pain.   Genitourinary: Positive for dysuria and flank pain. Negative for hematuria.   Musculoskeletal: Negative for myalgias.   Neurological: Negative for dizziness.       See HPI for further details. All other systems are negative.     PAST MEDICAL HISTORY       SOCIAL HISTORY  Social History     Social History Main Topics   • Smoking status: Never Smoker   • Smokeless tobacco: Never Used   • Alcohol use No   • Drug use: No   • Sexual activity: Not on file       SURGICAL HISTORY  patient denies any surgical history    CURRENT MEDICATIONS  Home Medications     Reviewed by Caitlyn Causey R.N. (Registered Nurse) on 18 at  2349  Med List Status: Partial   Medication Last Dose Status        Patient Zaheer Taking any Medications                       ALLERGIES  Allergies   Allergen Reactions   • Bactrim Ds        PHYSICAL EXAM  Physical Exam   Constitutional: She is oriented to person, place, and time. She appears well-developed and well-nourished.   HENT:   Head: Normocephalic and atraumatic.   Eyes: Conjunctivae are normal.   Neck: Normal range of motion. Neck supple.   Cardiovascular: Normal rate and regular rhythm.    Pulmonary/Chest: Effort normal and breath sounds normal.   Abdominal: Soft. Bowel sounds are normal. She exhibits no distension. There is no tenderness. There is no rebound.   Left CVA tenderness, gravid uterus around 1 cm below umbilicus, no tenderness of uterus.  No suprapubic tenderness.   Neurological: She is alert and oriented to person, place, and time.   Skin: Skin is warm and dry. No rash noted.   Psychiatric: She has a normal mood and affect. Her behavior is normal.         DIAGNOSTIC STUDIES / PROCEDURES      LABS  Results for orders placed or performed during the hospital encounter of 12/27/18   CBC WITH DIFFERENTIAL   Result Value Ref Range    WBC 7.4 4.8 - 10.8 K/uL    RBC 4.26 4.20 - 5.40 M/uL    Hemoglobin 13.9 12.0 - 16.0 g/dL    Hematocrit 39.5 37.0 - 47.0 %    MCV 92.7 81.4 - 97.8 fL    MCH 32.6 27.0 - 33.0 pg    MCHC 35.2 (H) 33.6 - 35.0 g/dL    RDW 40.4 35.9 - 50.0 fL    Platelet Count 196 164 - 446 K/uL    MPV 9.2 9.0 - 12.9 fL    Neutrophils-Polys 84.30 (H) 44.00 - 72.00 %    Lymphocytes 9.50 (L) 22.00 - 41.00 %    Monocytes 4.90 0.00 - 13.40 %    Eosinophils 0.30 0.00 - 6.90 %    Basophils 0.30 0.00 - 1.80 %    Immature Granulocytes 0.70 0.00 - 0.90 %    Nucleated RBC 0.00 /100 WBC    Neutrophils (Absolute) 6.22 2.00 - 7.15 K/uL    Lymphs (Absolute) 0.70 (L) 1.00 - 4.80 K/uL    Monos (Absolute) 0.36 0.00 - 0.85 K/uL    Eos (Absolute) 0.02 0.00 - 0.51 K/uL    Baso (Absolute) 0.02 0.00 - 0.12 K/uL     Immature Granulocytes (abs) 0.05 0.00 - 0.11 K/uL    NRBC (Absolute) 0.00 K/uL   CMP   Result Value Ref Range    Sodium 133 (L) 135 - 145 mmol/L    Potassium 3.5 (L) 3.6 - 5.5 mmol/L    Chloride 105 96 - 112 mmol/L    Co2 20 20 - 33 mmol/L    Anion Gap 8.0 0.0 - 11.9    Glucose 109 (H) 65 - 99 mg/dL    Bun 12 8 - 22 mg/dL    Creatinine 0.57 0.50 - 1.40 mg/dL    Calcium 8.2 (L) 8.4 - 10.2 mg/dL    AST(SGOT) 15 12 - 45 U/L    ALT(SGPT) 9 2 - 50 U/L    Alkaline Phosphatase 50 30 - 99 U/L    Total Bilirubin 0.8 0.1 - 1.5 mg/dL    Albumin 3.4 3.2 - 4.9 g/dL    Total Protein 6.8 6.0 - 8.2 g/dL    Globulin 3.4 1.9 - 3.5 g/dL    A-G Ratio 1.0 g/dL   URINALYSIS,CULTURE IF INDICATED   Result Value Ref Range    Micro Urine Req see below     Color Yellow     Character Clear     Ph 6.0 5.0 - 8.0    Glucose Negative Negative mg/dL    Ketones Negative Negative mg/dL    Protein Negative Negative mg/dL    Bilirubin Negative Negative    Nitrite Negative Negative    Leukocyte Esterase Negative Negative    Occult Blood Negative Negative   ESTIMATED GFR   Result Value Ref Range    GFR If African American >60 >60 mL/min/1.73 m 2    GFR If Non African American >60 >60 mL/min/1.73 m 2   REFRACTOMETER SG   Result Value Ref Range    Specific Gravity 1.028          RADIOLOGY  US-OB LIMITED TRANSABDOMINAL   Final Result         Single intrauterine pregnancy of an estimated gestational age of Average 18w 5d with an estimated date of delivery of 5/26/2019.         No placenta previa. No evidence of abruption.      US-RENAL   Final Result            Nonobstructive right renal calculus.      No hydronephrosis.              COURSE & MEDICAL DECISION MAKING  Pertinent Labs & Imaging studies reviewed. (See chart for details)  Patient here with likely pyelonephritis, possibly complicated by kidney stone.  Checking urine.  Checking OB ultrasound to ensure fetal viability, checking renal ultrasound for possible hydronephrosis.  If patient does have  infection she may need CT for evaluation of possible kidney stone as she would likely need stents for this.  Patient's urinalysis is unremarkable.  Patient's ultrasound reveals a healthy pregnancy and a nonobstructive right renal calculus.  There is no evidence of hydronephrosis.  While waiting for patient's labs and imaging patient's pain entirely resolved.  She likely passed the stone.  We will still give patient a prescription of tamsulosin in case the pain recurs but she understands to not fill this if her pain remains resolved.  She understands to call her gynecologist tomorrow and let them know that she was seen in the emergency department with a presumptive diagnosis of kidney stones.  Patient's abdominal exam is entirely benign, I believe his surgical pathology is highly unlikely in this well-appearing patient.  The patient will not drink alcohol nor drive with prescribed medications. The patient will return for worsening symptoms and is stable at the time of discharge. The patient verbalizes understanding and will comply.    FINAL IMPRESSION  1.   1. Pregnancy complicated by nephrolithiasis in second trimester, antepartum               Electronically signed by: Roland Rosario, 12/28/2018 12:18 AM

## 2019-02-13 ENCOUNTER — APPOINTMENT (OUTPATIENT)
Dept: OTHER | Facility: IMAGING CENTER | Age: 29
End: 2019-02-13

## 2019-02-17 ENCOUNTER — HOSPITAL ENCOUNTER (OUTPATIENT)
Facility: MEDICAL CENTER | Age: 29
End: 2019-02-17
Attending: OBSTETRICS & GYNECOLOGY | Admitting: OBSTETRICS & GYNECOLOGY
Payer: MEDICAID

## 2019-02-17 VITALS
HEIGHT: 67 IN | HEART RATE: 77 BPM | RESPIRATION RATE: 20 BRPM | DIASTOLIC BLOOD PRESSURE: 73 MMHG | TEMPERATURE: 98 F | SYSTOLIC BLOOD PRESSURE: 109 MMHG | BODY MASS INDEX: 24.8 KG/M2 | WEIGHT: 158 LBS

## 2019-02-17 LAB
APPEARANCE UR: ABNORMAL
COLOR UR AUTO: YELLOW
GLUCOSE UR QL STRIP.AUTO: NEGATIVE MG/DL
KETONES UR QL STRIP.AUTO: NEGATIVE MG/DL
LEUKOCYTE ESTERASE UR QL STRIP.AUTO: NEGATIVE
NITRITE UR QL STRIP.AUTO: NEGATIVE
PH UR STRIP.AUTO: 7.5 [PH]
PROT UR QL STRIP: NEGATIVE MG/DL
RBC UR QL AUTO: NEGATIVE
SP GR UR: 1.02

## 2019-02-17 PROCEDURE — 81002 URINALYSIS NONAUTO W/O SCOPE: CPT

## 2019-02-17 PROCEDURE — 87086 URINE CULTURE/COLONY COUNT: CPT

## 2019-02-18 NOTE — PROGRESS NOTES
"- pt is a , 26 weeks gestation IUP, with c/o frequent and burning with urination, unable to empty bladder, and pain/cramping after urination. Pt states \"I had kidney stones with my last pregnancy and have been diagnosed with kidney stones this pregnancy. It feels like the stone isn't in my kidney anymore but is in my bladder\". No c/o bleeding, lof, or dfm. FHR by doppler and toco placed, vss.   - Dr Montes called and updated. Received orders to send urine for culture, and discharge pt with increase in po hydration and call for culture results. Discussed poc with pt, verbalized understanding. Left to change clothes.  - addressed discharge instructions, with increase in po hydration, call OB office to check on urine culture. Left off floor by self.  "

## 2019-02-19 ENCOUNTER — HOSPITAL ENCOUNTER (OUTPATIENT)
Facility: MEDICAL CENTER | Age: 29
End: 2019-02-19
Attending: OBSTETRICS & GYNECOLOGY | Admitting: OBSTETRICS & GYNECOLOGY
Payer: MEDICAID

## 2019-02-19 VITALS
TEMPERATURE: 98.4 F | SYSTOLIC BLOOD PRESSURE: 105 MMHG | HEART RATE: 87 BPM | HEIGHT: 67 IN | DIASTOLIC BLOOD PRESSURE: 61 MMHG | WEIGHT: 158 LBS | BODY MASS INDEX: 24.8 KG/M2

## 2019-02-19 DIAGNOSIS — N20.0 NEPHROLITHIASIS: ICD-10-CM

## 2019-02-19 LAB
APPEARANCE UR: CLEAR
COLOR UR AUTO: YELLOW
GLUCOSE UR QL STRIP.AUTO: NEGATIVE MG/DL
KETONES UR QL STRIP.AUTO: NEGATIVE MG/DL
LEUKOCYTE ESTERASE UR QL STRIP.AUTO: NEGATIVE
NITRITE UR QL STRIP.AUTO: NEGATIVE
PH UR STRIP.AUTO: 7 [PH]
PROT UR QL STRIP: NEGATIVE MG/DL
RBC UR QL AUTO: NEGATIVE
SP GR UR: 1.01

## 2019-02-19 PROCEDURE — 81002 URINALYSIS NONAUTO W/O SCOPE: CPT

## 2019-02-19 RX ORDER — OXYCODONE HYDROCHLORIDE AND ACETAMINOPHEN 5; 325 MG/1; MG/1
1 TABLET ORAL EVERY 4 HOURS PRN
Qty: 15 TAB | Refills: 0 | Status: SHIPPED | OUTPATIENT
Start: 2019-02-19 | End: 2019-02-26

## 2019-02-19 RX ORDER — OXYCODONE HYDROCHLORIDE AND ACETAMINOPHEN 5; 325 MG/1; MG/1
1 TABLET ORAL EVERY 4 HOURS PRN
Status: ON HOLD | COMMUNITY
End: 2019-02-19

## 2019-02-19 RX ORDER — DOCUSATE SODIUM 100 MG/1
100 CAPSULE, LIQUID FILLED ORAL 2 TIMES DAILY
COMMUNITY
End: 2021-12-24

## 2019-02-19 RX ORDER — ONDANSETRON 4 MG/1
4 TABLET, ORALLY DISINTEGRATING ORAL EVERY 6 HOURS PRN
Status: ON HOLD | COMMUNITY
End: 2019-05-23

## 2019-02-19 NOTE — DISCHARGE INSTRUCTIONS
Pre-term Labor (<37 weeks):  Call your physician or return to the hospital if:  · You have painless regular contractions more than 4 in one hour.  · Your water breaks (remember time and color).  · You have menstrual-like cramps, a low dull backache or pressure in your pelvis or back.  · Your baby does not move enough to complete the daily kick count (10 movements in 2 hours).  · Your baby moves much less often than on the days before or you have not felt your baby move all day.  · Take your medication as prescribed    Go to appt tomorrow with [facility]

## 2019-02-19 NOTE — PROGRESS NOTES
0725:  presents at 26.2 wk gestation with severe right kidney and bladder pain. Also report UTI-like symptoms like pressure and spasms. Pt has been diagnosed with kidney stones last pregnancy and this pregnancy. Pt sees a urologist, Dr. Vance at [facility]. He has mentioned that if the pain gets worse, she would need a stent. Pt takes Percocet and zofran at home that was prescribed by Dr. Vance. States she only has two more Percocet at home; last took it at noon yesterday. Denies UCs, LOF, or VB; reprots FM. Hot pack provided. Urine dipped; WNL. Report given to Dr. Weinstein  0810: MD at bedside; pt is not having the pain anymore. MD called [facility]; they will call her for an appt tomorrow. Percocet prescription given after signing consent. PTL precautions provided

## 2019-02-20 LAB
BACTERIA UR CULT: NORMAL
SIGNIFICANT IND 70042: NORMAL
SITE SITE: NORMAL
SOURCE SOURCE: NORMAL

## 2019-05-22 ENCOUNTER — ANESTHESIA (OUTPATIENT)
Dept: OBGYN | Facility: MEDICAL CENTER | Age: 29
End: 2019-05-22
Payer: MEDICAID

## 2019-05-22 ENCOUNTER — HOSPITAL ENCOUNTER (INPATIENT)
Facility: MEDICAL CENTER | Age: 29
LOS: 2 days | End: 2019-05-24
Attending: OBSTETRICS & GYNECOLOGY | Admitting: OBSTETRICS & GYNECOLOGY
Payer: MEDICAID

## 2019-05-22 ENCOUNTER — ANESTHESIA EVENT (OUTPATIENT)
Dept: OBGYN | Facility: MEDICAL CENTER | Age: 29
End: 2019-05-22
Payer: MEDICAID

## 2019-05-22 LAB
BASOPHILS # BLD AUTO: 0.5 % (ref 0–1.8)
BASOPHILS # BLD: 0.05 K/UL (ref 0–0.12)
EOSINOPHIL # BLD AUTO: 0.17 K/UL (ref 0–0.51)
EOSINOPHIL NFR BLD: 1.7 % (ref 0–6.9)
ERYTHROCYTE [DISTWIDTH] IN BLOOD BY AUTOMATED COUNT: 43.2 FL (ref 35.9–50)
HCT VFR BLD AUTO: 43.3 % (ref 37–47)
HGB BLD-MCNC: 14 G/DL (ref 12–16)
HOLDING TUBE BB 8507: NORMAL
IMM GRANULOCYTES # BLD AUTO: 0.04 K/UL (ref 0–0.11)
IMM GRANULOCYTES NFR BLD AUTO: 0.4 % (ref 0–0.9)
LYMPHOCYTES # BLD AUTO: 2.24 K/UL (ref 1–4.8)
LYMPHOCYTES NFR BLD: 22.5 % (ref 22–41)
MCH RBC QN AUTO: 31.3 PG (ref 27–33)
MCHC RBC AUTO-ENTMCNC: 32.3 G/DL (ref 33.6–35)
MCV RBC AUTO: 96.7 FL (ref 81.4–97.8)
MONOCYTES # BLD AUTO: 0.47 K/UL (ref 0–0.85)
MONOCYTES NFR BLD AUTO: 4.7 % (ref 0–13.4)
NEUTROPHILS # BLD AUTO: 6.99 K/UL (ref 2–7.15)
NEUTROPHILS NFR BLD: 70.2 % (ref 44–72)
NRBC # BLD AUTO: 0 K/UL
NRBC BLD-RTO: 0 /100 WBC
PLATELET # BLD AUTO: 234 K/UL (ref 164–446)
PMV BLD AUTO: 10.5 FL (ref 9–12.9)
RBC # BLD AUTO: 4.48 M/UL (ref 4.2–5.4)
WBC # BLD AUTO: 10 K/UL (ref 4.8–10.8)

## 2019-05-22 PROCEDURE — 36415 COLL VENOUS BLD VENIPUNCTURE: CPT

## 2019-05-22 PROCEDURE — 304965 HCHG RECOVERY SERVICES

## 2019-05-22 PROCEDURE — 303615 HCHG EPIDURAL/SPINAL ANESTHESIA FOR LABOR

## 2019-05-22 PROCEDURE — 10907ZC DRAINAGE OF AMNIOTIC FLUID, THERAPEUTIC FROM PRODUCTS OF CONCEPTION, VIA NATURAL OR ARTIFICIAL OPENING: ICD-10-PCS | Performed by: OBSTETRICS & GYNECOLOGY

## 2019-05-22 PROCEDURE — 700111 HCHG RX REV CODE 636 W/ 250 OVERRIDE (IP)

## 2019-05-22 PROCEDURE — A9270 NON-COVERED ITEM OR SERVICE: HCPCS | Performed by: OBSTETRICS & GYNECOLOGY

## 2019-05-22 PROCEDURE — 700102 HCHG RX REV CODE 250 W/ 637 OVERRIDE(OP): Performed by: OBSTETRICS & GYNECOLOGY

## 2019-05-22 PROCEDURE — 85025 COMPLETE CBC W/AUTO DIFF WBC: CPT

## 2019-05-22 PROCEDURE — 700111 HCHG RX REV CODE 636 W/ 250 OVERRIDE (IP): Performed by: OBSTETRICS & GYNECOLOGY

## 2019-05-22 PROCEDURE — 59409 OBSTETRICAL CARE: CPT

## 2019-05-22 PROCEDURE — 700105 HCHG RX REV CODE 258: Performed by: OBSTETRICS & GYNECOLOGY

## 2019-05-22 PROCEDURE — 770002 HCHG ROOM/CARE - OB PRIVATE (112)

## 2019-05-22 PROCEDURE — 700101 HCHG RX REV CODE 250: Performed by: ANESTHESIOLOGY

## 2019-05-22 PROCEDURE — 700111 HCHG RX REV CODE 636 W/ 250 OVERRIDE (IP): Performed by: ANESTHESIOLOGY

## 2019-05-22 PROCEDURE — 700105 HCHG RX REV CODE 258: Performed by: ANESTHESIOLOGY

## 2019-05-22 RX ORDER — ONDANSETRON 2 MG/ML
4 INJECTION INTRAMUSCULAR; INTRAVENOUS EVERY 6 HOURS PRN
Status: DISCONTINUED | OUTPATIENT
Start: 2019-05-22 | End: 2019-05-24 | Stop reason: HOSPADM

## 2019-05-22 RX ORDER — HYDROCODONE BITARTRATE AND ACETAMINOPHEN 10; 325 MG/1; MG/1
1 TABLET ORAL EVERY 4 HOURS PRN
Status: DISCONTINUED | OUTPATIENT
Start: 2019-05-22 | End: 2019-05-24 | Stop reason: HOSPADM

## 2019-05-22 RX ORDER — HYDROCODONE BITARTRATE AND ACETAMINOPHEN 5; 325 MG/1; MG/1
1 TABLET ORAL EVERY 4 HOURS PRN
Status: DISCONTINUED | OUTPATIENT
Start: 2019-05-22 | End: 2019-05-24 | Stop reason: HOSPADM

## 2019-05-22 RX ORDER — SODIUM CHLORIDE, SODIUM LACTATE, POTASSIUM CHLORIDE, AND CALCIUM CHLORIDE .6; .31; .03; .02 G/100ML; G/100ML; G/100ML; G/100ML
1000 INJECTION, SOLUTION INTRAVENOUS
Status: COMPLETED | OUTPATIENT
Start: 2019-05-22 | End: 2019-05-22

## 2019-05-22 RX ORDER — IBUPROFEN 600 MG/1
600 TABLET ORAL EVERY 6 HOURS PRN
Status: DISCONTINUED | OUTPATIENT
Start: 2019-05-22 | End: 2019-05-24 | Stop reason: HOSPADM

## 2019-05-22 RX ORDER — MISOPROSTOL 200 UG/1
800 TABLET ORAL
Status: DISCONTINUED | OUTPATIENT
Start: 2019-05-22 | End: 2019-05-22 | Stop reason: HOSPADM

## 2019-05-22 RX ORDER — LIDOCAINE HYDROCHLORIDE AND EPINEPHRINE 15; 5 MG/ML; UG/ML
INJECTION, SOLUTION EPIDURAL PRN
Status: DISCONTINUED | OUTPATIENT
Start: 2019-05-22 | End: 2019-05-22 | Stop reason: SURG

## 2019-05-22 RX ORDER — LIDOCAINE HYDROCHLORIDE AND EPINEPHRINE 15; 5 MG/ML; UG/ML
INJECTION, SOLUTION EPIDURAL
Status: COMPLETED
Start: 2019-05-22 | End: 2019-05-22

## 2019-05-22 RX ORDER — SODIUM CHLORIDE, SODIUM LACTATE, POTASSIUM CHLORIDE, CALCIUM CHLORIDE 600; 310; 30; 20 MG/100ML; MG/100ML; MG/100ML; MG/100ML
INJECTION, SOLUTION INTRAVENOUS CONTINUOUS
Status: DISPENSED | OUTPATIENT
Start: 2019-05-22 | End: 2019-05-22

## 2019-05-22 RX ORDER — BUPIVACAINE HYDROCHLORIDE 2.5 MG/ML
INJECTION, SOLUTION EPIDURAL; INFILTRATION; INTRACAUDAL
Status: COMPLETED
Start: 2019-05-22 | End: 2019-05-22

## 2019-05-22 RX ORDER — BUPIVACAINE HYDROCHLORIDE 2.5 MG/ML
INJECTION, SOLUTION EPIDURAL; INFILTRATION; INTRACAUDAL PRN
Status: DISCONTINUED | OUTPATIENT
Start: 2019-05-22 | End: 2019-05-22 | Stop reason: SURG

## 2019-05-22 RX ORDER — AMOXICILLIN 250 MG/1
250 CAPSULE ORAL 3 TIMES DAILY
COMMUNITY
End: 2021-12-24

## 2019-05-22 RX ORDER — ACETAMINOPHEN 325 MG/1
325 TABLET ORAL EVERY 4 HOURS PRN
Status: DISCONTINUED | OUTPATIENT
Start: 2019-05-22 | End: 2019-05-24 | Stop reason: HOSPADM

## 2019-05-22 RX ORDER — ONDANSETRON 4 MG/1
4 TABLET, ORALLY DISINTEGRATING ORAL EVERY 6 HOURS PRN
Status: DISCONTINUED | OUTPATIENT
Start: 2019-05-22 | End: 2019-05-24 | Stop reason: HOSPADM

## 2019-05-22 RX ORDER — ROPIVACAINE HYDROCHLORIDE 2 MG/ML
INJECTION, SOLUTION EPIDURAL; INFILTRATION; PERINEURAL
Status: COMPLETED
Start: 2019-05-22 | End: 2019-05-22

## 2019-05-22 RX ORDER — SODIUM CHLORIDE, SODIUM LACTATE, POTASSIUM CHLORIDE, AND CALCIUM CHLORIDE .6; .31; .03; .02 G/100ML; G/100ML; G/100ML; G/100ML
250 INJECTION, SOLUTION INTRAVENOUS PRN
Status: DISCONTINUED | OUTPATIENT
Start: 2019-05-22 | End: 2019-05-22 | Stop reason: HOSPADM

## 2019-05-22 RX ORDER — ROPIVACAINE HYDROCHLORIDE 2 MG/ML
INJECTION, SOLUTION EPIDURAL; INFILTRATION; PERINEURAL CONTINUOUS
Status: DISCONTINUED | OUTPATIENT
Start: 2019-05-22 | End: 2019-05-24 | Stop reason: HOSPADM

## 2019-05-22 RX ADMIN — LIDOCAINE HYDROCHLORIDE,EPINEPHRINE BITARTRATE 2 ML: 15; .005 INJECTION, SOLUTION EPIDURAL; INFILTRATION; INTRACAUDAL; PERINEURAL at 14:59

## 2019-05-22 RX ADMIN — Medication 125 ML/HR: at 19:32

## 2019-05-22 RX ADMIN — SODIUM CHLORIDE, POTASSIUM CHLORIDE, SODIUM LACTATE AND CALCIUM CHLORIDE: 600; 310; 30; 20 INJECTION, SOLUTION INTRAVENOUS at 14:03

## 2019-05-22 RX ADMIN — ROPIVACAINE HYDROCHLORIDE: 2 INJECTION, SOLUTION EPIDURAL; INFILTRATION; PERINEURAL at 15:03

## 2019-05-22 RX ADMIN — BUPIVACAINE HYDROCHLORIDE 8 ML: 2.5 INJECTION, SOLUTION EPIDURAL; INFILTRATION; INTRACAUDAL; PERINEURAL at 14:59

## 2019-05-22 RX ADMIN — FENTANYL CITRATE 100 MCG: 50 INJECTION, SOLUTION INTRAMUSCULAR; INTRAVENOUS at 14:59

## 2019-05-22 RX ADMIN — LIDOCAINE HYDROCHLORIDE,EPINEPHRINE BITARTRATE 3 ML: 15; .005 INJECTION, SOLUTION EPIDURAL; INFILTRATION; INTRACAUDAL; PERINEURAL at 14:56

## 2019-05-22 RX ADMIN — Medication 1 MILLI-UNITS/MIN: at 15:15

## 2019-05-22 RX ADMIN — ROPIVACAINE HYDROCHLORIDE: 2 INJECTION, SOLUTION EPIDURAL; INFILTRATION at 15:03

## 2019-05-22 RX ADMIN — IBUPROFEN 600 MG: 600 TABLET ORAL at 19:32

## 2019-05-22 RX ADMIN — SODIUM CHLORIDE, POTASSIUM CHLORIDE, SODIUM LACTATE AND CALCIUM CHLORIDE 1000 ML: 600; 310; 30; 20 INJECTION, SOLUTION INTRAVENOUS at 15:09

## 2019-05-22 RX ADMIN — HYDROCODONE BITARTRATE AND ACETAMINOPHEN 1 TABLET: 5; 325 TABLET ORAL at 22:21

## 2019-05-22 ASSESSMENT — PATIENT HEALTH QUESTIONNAIRE - PHQ9
1. LITTLE INTEREST OR PLEASURE IN DOING THINGS: NOT AT ALL
SUM OF ALL RESPONSES TO PHQ9 QUESTIONS 1 AND 2: 0
2. FEELING DOWN, DEPRESSED, IRRITABLE, OR HOPELESS: NOT AT ALL

## 2019-05-22 ASSESSMENT — PAIN SCALES - GENERAL: PAIN_LEVEL: 0

## 2019-05-22 ASSESSMENT — LIFESTYLE VARIABLES: EVER_SMOKED: NEVER

## 2019-05-22 NOTE — ANESTHESIA PREPROCEDURE EVALUATION
Relevant Problems   No relevant active problems       Physical Exam    Airway   Mallampati: II  TM distance: >3 FB  Neck ROM: full       Cardiovascular - normal exam  Rhythm: regular  Rate: normal  (-) murmur     Dental - normal exam         Pulmonary - normal exam  Breath sounds clear to auscultation     Abdominal    Neurological - normal exam               No past medical history on file.  No past surgical history on file.  Social History   Substance Use Topics   • Smoking status: Never Smoker   • Smokeless tobacco: Never Used   • Alcohol use No     Allergies   Allergen Reactions   • Bactrim Ds        Anesthesia Plan    ASA 2       Plan - epidural   Neuraxial block will be labor analgesia              Pertinent diagnostic labs and testing reviewed    Informed Consent:    Anesthetic plan and risks discussed with patient.

## 2019-05-22 NOTE — PROGRESS NOTES
Pt presents to L&D with complaints of possible SROM and UC's. Pt taken to S222 for assessment.     1020 TOCO and EFM applied, VSS. Pt reports +FM, denies VB. Pt states that she feels she may have just peed herself and that her water has not broken.     1145 RN and Dr. Ogden at bedside, will reassess for admission    1314 RN at bedside, pt to be admitted for labor. POC discussed.     1755 Dr. Ogden at bedside for delivery    1759 Delivery of viable baby boy, apgars 8/9, EBL <200, R&L vaginal lacs repaired.    1925 RN at bedside, pt medicated per MAR. Pt eating dinner and denies any further needs at this time.     2010 RN at bedside, pt up to use restroom, not able to void at this time. Pt ambulated back to bed.     2030 Pt transferred to  via wheelchair.     2045 Report given to Ruben JARA, POC discussed

## 2019-05-22 NOTE — H&P
CHIEF COMPLAINT:  Irregular uterine contractions.    HISTORY OF PRESENT ILLNESS:  The patient is a 29-year-old female  4,   para 1, at 39-3/7 weeks gestational age.  She presents with complaints of   uterine contractions and is found to be 4-5 cm dilated.  Her pregnancy has   been uncomplicated.    PAST SURGICAL HISTORY:  Negative.    SOCIAL HISTORY:  Negative.    MEDICAL HISTORY:  Positive for known kidney stones with this pregnancy.    ALLERGIES:  PATIENT IS ALLERGIC TO SULFA.    CURRENT MEDICATIONS:  Include a prenatal vitamin.    LABORATORY DATA:  Show a blood type of A positive, rubella immune, her group B   strep culture is negative.    PHYSICAL EXAMINATION:  VITAL SIGNS:  Stable on admission.  CARDIOVASCULAR:  Regular rate and rhythm.  LUNGS:  Clear.  VAGINAL:  On my check a 7 cm, 90% effaced, -1 station.  Fetal heart tones are   140s and reactive.  Tocometry is every 3-6 minutes.    ASSESSMENT AND PLAN:  This is a term intrauterine pregnancy.  I performed   artificial rupture of membranes.  Patient has received an epidural and a   vaginal delivery is expected.       ____________________________________     Corinne E. Capurro, MD [facility]    DD:  2019 15:26:55  DT:  2019 15:34:52    D#:  [id number]  Job#:  [id number]

## 2019-05-22 NOTE — ANESTHESIA PROCEDURE NOTES
Epidural Block  Performed by: SUNNY CALVILLO  Authorized by: SUNNY CALVILLO     Patient Location:  OB  Start Time:  5/22/2019 2:54 PM  End Time:  5/22/2019 2:56 PM  Reason for Block: labor analgesia    patient identified, IV checked, site marked, risks and benefits discussed, surgical consent, monitors and equipment checked, pre-op evaluation and timeout performed    Patient Position:  Sitting  Prep: ChloraPrep, patient draped and sterile technique    Monitoring:  Blood pressure, continuous pulse oximetry and heart rate  Approach:  Midline  Location:  L3-L4  Injection Technique:  SANGITA saline  Skin infiltration:  Lidocaine  Strength:  1%  Dose:  3ml  Needle Type:  Tuohy  Needle Gauge:  17 G  Needle Length:  3.5 in  Loss of resistance::  4  Catheter Size:  19 G  Catheter at Skin Depth:  9  Test Dose:  Lidocaine 1.5% with epinephrine 1-to-200,000  Test Dose Result:  Negative

## 2019-05-23 LAB
ERYTHROCYTE [DISTWIDTH] IN BLOOD BY AUTOMATED COUNT: 42 FL (ref 35.9–50)
HCT VFR BLD AUTO: 34.2 % (ref 37–47)
HGB BLD-MCNC: 11.3 G/DL (ref 12–16)
MCH RBC QN AUTO: 31.9 PG (ref 27–33)
MCHC RBC AUTO-ENTMCNC: 33 G/DL (ref 33.6–35)
MCV RBC AUTO: 96.6 FL (ref 81.4–97.8)
PLATELET # BLD AUTO: 159 K/UL (ref 164–446)
PMV BLD AUTO: 10.2 FL (ref 9–12.9)
RBC # BLD AUTO: 3.54 M/UL (ref 4.2–5.4)
WBC # BLD AUTO: 11.5 K/UL (ref 4.8–10.8)

## 2019-05-23 PROCEDURE — 85027 COMPLETE CBC AUTOMATED: CPT

## 2019-05-23 PROCEDURE — 0UQMXZZ REPAIR VULVA, EXTERNAL APPROACH: ICD-10-PCS | Performed by: OBSTETRICS & GYNECOLOGY

## 2019-05-23 PROCEDURE — A9270 NON-COVERED ITEM OR SERVICE: HCPCS | Performed by: OBSTETRICS & GYNECOLOGY

## 2019-05-23 PROCEDURE — 770002 HCHG ROOM/CARE - OB PRIVATE (112)

## 2019-05-23 PROCEDURE — 700112 HCHG RX REV CODE 229: Performed by: OBSTETRICS & GYNECOLOGY

## 2019-05-23 PROCEDURE — 36415 COLL VENOUS BLD VENIPUNCTURE: CPT

## 2019-05-23 PROCEDURE — 700102 HCHG RX REV CODE 250 W/ 637 OVERRIDE(OP): Performed by: OBSTETRICS & GYNECOLOGY

## 2019-05-23 RX ORDER — DOCUSATE SODIUM 100 MG/1
100 CAPSULE, LIQUID FILLED ORAL 2 TIMES DAILY PRN
Status: DISCONTINUED | OUTPATIENT
Start: 2019-05-23 | End: 2019-05-24 | Stop reason: HOSPADM

## 2019-05-23 RX ORDER — SODIUM CHLORIDE, SODIUM LACTATE, POTASSIUM CHLORIDE, CALCIUM CHLORIDE 600; 310; 30; 20 MG/100ML; MG/100ML; MG/100ML; MG/100ML
INJECTION, SOLUTION INTRAVENOUS PRN
Status: DISCONTINUED | OUTPATIENT
Start: 2019-05-23 | End: 2019-05-24 | Stop reason: HOSPADM

## 2019-05-23 RX ORDER — IBUPROFEN 600 MG/1
600 TABLET ORAL EVERY 6 HOURS PRN
Qty: 30 TAB | Refills: 1 | Status: SHIPPED | OUTPATIENT
Start: 2019-05-23 | End: 2021-12-24

## 2019-05-23 RX ORDER — MISOPROSTOL 200 UG/1
600 TABLET ORAL
Status: DISCONTINUED | OUTPATIENT
Start: 2019-05-23 | End: 2019-05-24 | Stop reason: HOSPADM

## 2019-05-23 RX ADMIN — HYDROCODONE BITARTRATE AND ACETAMINOPHEN 1 TABLET: 5; 325 TABLET ORAL at 06:29

## 2019-05-23 RX ADMIN — DOCUSATE SODIUM 100 MG: 100 CAPSULE, LIQUID FILLED ORAL at 02:32

## 2019-05-23 RX ADMIN — IBUPROFEN 600 MG: 600 TABLET ORAL at 02:32

## 2019-05-23 RX ADMIN — HYDROCODONE BITARTRATE AND ACETAMINOPHEN 1 TABLET: 5; 325 TABLET ORAL at 20:01

## 2019-05-23 RX ADMIN — HYDROCODONE BITARTRATE AND ACETAMINOPHEN 1 TABLET: 5; 325 TABLET ORAL at 16:10

## 2019-05-23 RX ADMIN — HYDROCODONE BITARTRATE AND ACETAMINOPHEN 1 TABLET: 5; 325 TABLET ORAL at 02:32

## 2019-05-23 RX ADMIN — IBUPROFEN 600 MG: 600 TABLET ORAL at 10:55

## 2019-05-23 RX ADMIN — IBUPROFEN 600 MG: 600 TABLET ORAL at 20:01

## 2019-05-23 RX ADMIN — HYDROCODONE BITARTRATE AND ACETAMINOPHEN 1 TABLET: 5; 325 TABLET ORAL at 10:55

## 2019-05-23 NOTE — ANESTHESIA TIME REPORT
Anesthesia Start and Stop Event Times     Date Time Event    5/22/2019 1442 Anesthesia Start     1759 Anesthesia Stop        Responsible Staff  05/22/19    Name Role Begin End    Armond Schultz M.D. Anesth [id number]        Preop Diagnosis (Free Text):  Pre-op Diagnosis             Preop Diagnosis (Codes):    Post op Diagnosis  Pain during labor  term IUP at 39 weeks gestation, bustillos    Premium Reason  A. 3PM - 7AM    Comments:

## 2019-05-23 NOTE — PROGRESS NOTES
PPD#1  S) pt without c/o  O) vss   fundus: firm   ext: no edema  Hgb: 11.3  A/P PPD#1, s/p    - stable, desires d/c home

## 2019-05-23 NOTE — ANESTHESIA POSTPROCEDURE EVALUATION
Patient: Lucy Gonzalez    Procedure Summary     Date:  05/22/19 Room / Location:      Anesthesia Start:  1442 Anesthesia Stop:  1759    Procedure:  Labor Epidural Diagnosis:      Scheduled Providers:   Responsible Provider:  Armond Schultz M.D.    Anesthesia Type:  epidural ASA Status:  2          Final Anesthesia Type: epidural  Last vitals  BP   Blood Pressure: 112/68    Temp   36.6 °C (97.9 °F)    Pulse   Pulse: 80   Resp     18   SpO2   99 %      Anesthesia Post Evaluation    Patient location during evaluation: floor  Patient participation: complete - patient participated  Level of consciousness: awake and alert  Pain score: 0    Airway patency: patent  Anesthetic complications: no  Cardiovascular status: hemodynamically stable  Respiratory status: acceptable  Hydration status: euvolemic    PONV: none

## 2019-05-23 NOTE — PROGRESS NOTES
Pediatrician is keeping infant until tomorrow and patient is breast feeding and requesting to stay. Dr. Mccoy called and notified. Discharge for today is cancelled.

## 2019-05-23 NOTE — PROGRESS NOTES
0700-Bedside report received from Ruben Barbosa RN. Patient denies any needs at this time and states she will ask for pain medication as needed.  1130-Assessment completed.  Patient progressing according to plan of care.  Patient encouraged to call for any needs.  Discussed pain management.  Bonding with infant, FOB at bedside.

## 2019-05-23 NOTE — PROGRESS NOTES
Patient to room 321 via wheelchair infant in arms. Report received from ESTEFANI Hart. Bands verified. Cuddles tag on and flashing. IV patent running second bag of pitocin at 125 cc/hr. Patient declining pain at this time. Whiteboards updated, POC discussed. Patient oriented to unit and room. Call light within reach. Patient encouraged to call with any needs and or concerns.

## 2019-05-23 NOTE — ANESTHESIA QCDR
2019 [facility] Clinical Data Registry (for Quality Improvement)     Postoperative nausea/vomiting risk protocol (Adult = 18 yrs and Pediatric 3-17 yrs)- (430 and 463)  General inhalation anesthetic (NOT TIVA) with PONV risk factors: No  Provision of anti-emetic therapy with at least 2 different classes of agents: N/A  Patient DID NOT receive anti-emetic therapy and reason is documented in Medical Record: N/A    Multimodal Pain Management- (AQI59)  Patient undergoing Elective Surgery (i.e. Outpatient, or ASC, or Prescheduled Surgery prior to Hospital Admission): No  Use of Multimodal Pain Management, two or more drugs and/or interventions, NOT including systemic opioids: N/A  Exception: Documented allergy to multiple classes of analgesics: N/A    PACU assessment of acute postoperative pain prior to Anesthesia Care End- Applies to Patients Age = 18- (ABG7)  Initial PACU pain score is which of the following: < 7/10  Patient unable to report pain score: N/A    Post-anesthetic transfer of care checklist/protocol to PACU/ICU- (426 and 427)  Upon conclusion of case, patient transferred to which of the following locations: PACU/Non-ICU  Use of transfer checklist/protocol: Yes  Exclusion: Service Performed in Patient Hospital Room (and thus did not require transfer): N/A    PACU Reintubation- (AQI31)  General anesthesia requiring endotracheal intubation (ETT) along with subsequent extubation in OR or PACU: No  Required reintubation in the PACU: N/A  Extubation was a planned trial documented in the medical record prior to removal of the original airway device: N/A    Unplanned admission to ICU related to anesthesia service up through end of PACU care- (MD51)  Unplanned admission to ICU (not initially anticipated at anesthesia start time): No

## 2019-05-23 NOTE — L&D DELIVERY NOTE
DATE OF SERVICE:  05/22/2019    This is a normal spontaneous vaginal delivery of a viable male infant over an   intact perineum with epidural placement.  Spontaneous vaginal delivery of the   head in controlled sterile fashion.  No nuchal cord was noted.  The rest of   the infant delivered through uncomplicated.  Cord was clamped x2 and cut and   infant was placed on mother's chest.  Spontaneous vaginal delivery of intact   placenta with 3-vessel cord.  Bilateral periurethral lacerations were repaired   using 3-0 Vicryl.  Fundus was firm with Pitocin and massage.  Apgars of the   infant were 8 and 9.  Total estimated blood loss was less than 200 mL.  Mother   and infant were both stable at the end of delivery.       ____________________________________     Corinne E. Capurro, MD C[facility] / NTS    DD:  05/22/2019 18:10:31  DT:  05/23/2019 00:58:47    D#:  [id number]  Job#:  [id number]

## 2019-05-24 VITALS
DIASTOLIC BLOOD PRESSURE: 72 MMHG | HEART RATE: 62 BPM | TEMPERATURE: 97.6 F | SYSTOLIC BLOOD PRESSURE: 106 MMHG | WEIGHT: 185 LBS | BODY MASS INDEX: 29.03 KG/M2 | RESPIRATION RATE: 19 BRPM | HEIGHT: 67 IN | OXYGEN SATURATION: 96 %

## 2019-05-24 PROCEDURE — 700112 HCHG RX REV CODE 229: Performed by: OBSTETRICS & GYNECOLOGY

## 2019-05-24 PROCEDURE — A9270 NON-COVERED ITEM OR SERVICE: HCPCS | Performed by: OBSTETRICS & GYNECOLOGY

## 2019-05-24 PROCEDURE — 700102 HCHG RX REV CODE 250 W/ 637 OVERRIDE(OP): Performed by: OBSTETRICS & GYNECOLOGY

## 2019-05-24 RX ADMIN — DOCUSATE SODIUM 100 MG: 100 CAPSULE, LIQUID FILLED ORAL at 13:37

## 2019-05-24 RX ADMIN — HYDROCODONE BITARTRATE AND ACETAMINOPHEN 1 TABLET: 5; 325 TABLET ORAL at 04:49

## 2019-05-24 RX ADMIN — HYDROCODONE BITARTRATE AND ACETAMINOPHEN 1 TABLET: 5; 325 TABLET ORAL at 13:37

## 2019-05-24 RX ADMIN — IBUPROFEN 600 MG: 600 TABLET ORAL at 16:10

## 2019-05-24 RX ADMIN — IBUPROFEN 600 MG: 600 TABLET ORAL at 02:28

## 2019-05-24 RX ADMIN — IBUPROFEN 600 MG: 600 TABLET ORAL at 10:06

## 2019-05-24 RX ADMIN — HYDROCODONE BITARTRATE AND ACETAMINOPHEN 1 TABLET: 5; 325 TABLET ORAL at 00:35

## 2019-05-24 ASSESSMENT — EDINBURGH POSTNATAL DEPRESSION SCALE (EPDS)
I HAVE BEEN SO UNHAPPY THAT I HAVE BEEN CRYING: ONLY OCCASIONALLY
THE THOUGHT OF HARMING MYSELF HAS OCCURRED TO ME: NEVER
I HAVE BLAMED MYSELF UNNECESSARILY WHEN THINGS WENT WRONG: NOT VERY OFTEN
THINGS HAVE BEEN GETTING ON TOP OF ME: NO, I HAVE BEEN COPING AS WELL AS EVER
I HAVE BEEN ANXIOUS OR WORRIED FOR NO GOOD REASON: YES, SOMETIMES
I HAVE FELT SCARED OR PANICKY FOR NO GOOD REASON: NO, NOT MUCH
I HAVE BEEN ABLE TO LAUGH AND SEE THE FUNNY SIDE OF THINGS: AS MUCH AS I ALWAYS COULD
I HAVE LOOKED FORWARD WITH ENJOYMENT TO THINGS: AS MUCH AS I EVER DID
I HAVE FELT SAD OR MISERABLE: NO, NOT AT ALL
I HAVE BEEN SO UNHAPPY THAT I HAVE HAD DIFFICULTY SLEEPING: NOT AT ALL

## 2019-05-24 NOTE — DISCHARGE SUMMARY
DATE OF ADMISSION:  05/22/2019    DATE OF DISCHARGE:  05/24/2019    PRINCIPAL DIAGNOSES:  1.  Intrauterine pregnancy at 39 weeks gestation.  2.  Active labor.    PRINCIPAL PROCEDURE:  Normal spontaneous vaginal delivery.      HOSPITAL COURSE:  Patient arrived in active labor.  She underwent a vaginal   delivery with Dr. Ogden of a male infant.  Apgars were 8 and 9, weight was 9   pounds 5 ounces.  By postpartum day #2, she is ambulating, voiding,   tolerating regular diet.  Pain is controlled with p.o. pain medication and she   will be discharged home later today.  She will follow up with Dr. Ogden in   6 weeks, sooner if needed.  Verbal instructions given and questions answered.    She will resume prenatal vitamins.  She will be given a prescription for   Motrin.  She can take Tylenol as well, as needed for pain.  Her postoperative   hematocrit was 34.2.  Pelvic rest, no heavy lifting, pulling or pushing.    Again, she will follow up with Dr. Ogden in 6 weeks in the office.       ____________________________________     MD JODY BUTLER / NTS    DD:  05/24/2019 08:01:40  DT:  05/24/2019 08:07:48    D#:  [id number]  Job#:  [id number]

## 2019-05-24 NOTE — CARE PLAN
Problem: Potential for postpartum infection related to presence of episiotomy/vaginal tear and/or uterine contamination  Goal: Patient will be absent from signs and symptoms of infection  Outcome: PROGRESSING AS EXPECTED  Patient is displaying no signs or symptoms of infection at time of assessment.     Problem: Alteration in comfort related to episiotomy, vaginal repair and/or after birth pains  Goal: Patient is able to ambulate, care for self and infant  Outcome: PROGRESSING AS EXPECTED  Patient is able to ambulate around the room comfortably. Patient is able to care for infant by feeding, diapering, and swaddling.

## 2019-05-24 NOTE — DISCHARGE INSTRUCTIONS
POSTPARTUM DISCHARGE INSTRUCTIONS FOR MOM      Pelvic rest, no heavy lifting/pulling /pushing   F/u 6 weeks postpartum   Continue prenatal vitamins daily    YOB: 1990   Age: 29 y.o.               Admit Date: 5/22/2019     Discharge Date: 5/24/2019  Attending Doctor:  Corinne E Capurro, M.D.                  Allergies:  Bactrim ds    Discharged to home by car. Discharged via wheelchair, hospital escort: Yes.  Special equipment needed: Not Applicable  Belongings with: Personal  Be sure to schedule a follow-up appointment with your primary care doctor or any specialists as instructed.     Discharge Plan:   Diet Plan: Discussed  Activity Level: Discussed  Confirmed Follow up Appointment: Patient to Call and Schedule Appointment  Confirmed Symptoms Management: Discussed  Medication Reconciliation Updated: Yes    REASONS TO CALL YOUR OBSTETRICIAN:  1.   Persistent fever or shaking chills (Temperature higher than 100.4)  2.   Heavy bleeding (soaking more than 1 pad per hour); Passing clots  3.   Foul odor from vagina  4.   Mastitis (Breast infection; breast pain, chills, fever, redness)  5.   Urinary pain, burning or frequency  6.   Episiotomy infection    8.   Severe depression longer than 24 hours    HAND WASHING  · Prior to handling the baby.  · Before breastfeeding or bottle feeding baby.  · After using the bathroom or changing the baby's diaper.        VAGINAL CARE  · Nothing inside vagina for 6 weeks: no sexual intercourse, tampons or douching.  · Bleeding may continue for 2-4 weeks.  Amount may vary.    · Call your physician for heavy bleeding which means soaking more than 1 pad per hour    BIRTH CONTROL  · It is possible to become pregnant at any time after delivery and while breastfeeding.  · Plan to discuss a method of birth control with your physician at your follow up visit. visit.    DIET AND ELIMINATION  · Eating more fiber (bran cereal, fruits, and vegetables) and drinking plenty of fluids will  "help to avoid constipation.  · Urinary frequency after childbirth is normal.    POSTPARTUM BLUES  During the first few days after birth, you may experience a sense of the \"blues\" which may include impatience, irritability or even crying.  These feeling come and go quickly.  However, as many as 1 in 10 women experience emotional symptoms known as postpartum depression.    Postpartum depression:  May start as early as the second or third day after delivery or take several weeks or months to develop.  Symptoms of \"blues\" are present, but are more intense:  Crying spells; loss of appetite; feelings of hopelessness or loss of control; fear of touching the baby; over concern or no concern at all about the baby; little or no concern about your own appearance/caring for yourself; and/or inability to sleep or excessive sleeping.  Contact your physician if you are experiencing any of these symptoms.    Crisis Hotline:  · [place] Crisis Hotline:  0-854-FXZIUCQ  Or 1-115.609.6986  · [place] Crisis Hotline:  1-989.818.4989  Or 061-421-4162    PREVENTING SHAKEN BABY:  If you are angry or stressed, PUT THE BABY IN THE CRIB, step away, take some deep breaths, and wait until you are calm to care for the baby.  DO NOT SHAKE THE BABY.  You are not alone, call a supporter for help.    · Crisis Call Center 24/7 crisis line 889-909-1539 or 1-487.667.2110  · You can also text them, text \"ANSWER\" to 345955    QUIT SMOKING/TOBACCO USE:  I understand the use of any tobacco products increases my chance of suffering from future heart disease and could cause other illnesses which may shorten my life. Quitting the use of tobacco products is the single most important thing I can do to improve my health. For further information on smoking / tobacco cessation call a Toll Free Quit Line at 1-733.578.4477 (*National [employer] [place]) or 1-526.723.2333 (American Lung Association) or you can access the web based program at www.lungusa.org.    · [place] " Tobacco Users Help Line:  (107) 763-4983       Toll Free: 1-954.995.3964  · Quit Tobacco Program [facility] (582)050-5240    DEPRESSION / SUICIDE RISK:  As you are discharged from this [facility], it is important to learn how to keep safe from harming yourself.    Recognize the warning signs:  · Abrupt changes in personality, positive or negative- including increase in energy   · Giving away possessions  · Change in eating patterns- significant weight changes-  positive or negative  · Change in sleeping patterns- unable to sleep or sleeping all the time   · Unwillingness or inability to communicate  · Depression  · Unusual sadness, discouragement and loneliness  · Talk of wanting to die  · Neglect of personal appearance   · Rebelliousness- reckless behavior  · Withdrawal from people/activities they love  · Confusion- inability to concentrate     If you or a loved one observes any of these behaviors or has concerns about self-harm, here's what you can do:  · Talk about it- your feelings and reasons for harming yourself  · Remove any means that you might use to hurt yourself (examples: pills, rope, extension cords, firearm)  · Get professional help from the community (Mental Health, Substance Abuse, psychological counseling)  · Do not be alone:Call your Safe Contact- someone whom you trust who will be there for you.  · Call your local CRISIS HOTLINE 671-9204 or 885-109-6956  · Call your local Children's Mobile Crisis Response Team [place] (179) 758-5026 or www.[facility]  · Call the toll free National Suicide Prevention Hotlines   · National Suicide Prevention Lifeline 450-098-DJKE (2913)  · National Hope Line Network 800-SUICIDE (807-5312)    DISCHARGE SURVEY:  Thank you for choosing [facility].  We hope we provided you with very good care.  You may be receiving a survey in the mail.  Please fill it out.  Your opinion is valuable to us.        My signature on this  form indicates that:  1.  I have reviewed and understand the above information  2.  My questions regarding this information have been answered to my satisfaction.  3.  I have formulated a plan with my discharge nurse to obtain my prescribed medication for home.

## 2019-05-24 NOTE — CARE PLAN
Problem: Altered physiologic condition related to immediate post-delivery state and potential for bleeding/hemorrhage  Goal: Patient physiologically stable as evidenced by normal lochia, palpable uterine involution and vital signs within normal limits  Outcome: PROGRESSING AS EXPECTED  Fundus was firm one cm below umbilicus midline and lochia was light rubra.    Problem: Alteration in comfort related to episiotomy, vaginal repair and/or after birth pains  Goal: Patient verbalizes acceptable pain level  Outcome: PROGRESSING AS EXPECTED  Patient states adequate relief of uterine cramping and perineal discomfort from Vicodin and ibuprofen

## 2019-05-24 NOTE — PROGRESS NOTES
Progress Note    Lucy Solizle Carlos    day 2  Chief Admitting Dx: Pregnancy  Labor and delivery, indication for care  Delivery Type: vaginal, spontaneous      Subjective:  Ambulating: voiding, tolerating diet, normal lochia, pain controlled. Breastfeeding. Baby was not released yesterday so she stayed another night.   Vitals:    05/23/19 0200 05/23/19 0600 05/23/19 1800 05/24/19 0600   BP: 118/82 108/64 107/69 106/72   Pulse: 76 64 74 62   Resp: 16 16 20 19   Temp: 37.1 °C (98.7 °F) 36.3 °C (97.4 °F) 36.7 °C (98.1 °F) 36.4 °C (97.6 °F)   TempSrc: Temporal Temporal Temporal Temporal   SpO2: 96% 96% 96% 96%   Weight:       Height:           Exam:  Gen: no acute distress  Abdomen: soft nt/nd firm fundus  Ext; no calf pain bilateral LE    Labs:   No results found for this or any previous visit (from the past 24 hour(s)).    Assessment:  Ppd 2    Plan:  Discharge home  Encourage ambulation  Pelvic rest, no heavy lifting/pulling /pushing  F/u in my office 6 weeks postpartum  Continue prenatal vitamins daily  Give Rhogam if Rh negative and indicated  Give MMR if indcated prior to discharge  Encourage breastfeeding      Lo Mccoy M.D.

## 2019-05-24 NOTE — PROGRESS NOTES
Took report from ESTEFANI Gongora. Assumed patient care. Patient assessed VS stable. Pain reported as 5/10 on pain scale. Will medicate per MAR. Breasts soft. Fundus firm 1 below U, lochia light rubra. Legs show no swelling, heat, redness, pain. All questions and concerns answered at this time. Bed rails up x 2. Call light in reach. Patient belongings in reach. Will continue to monitor.

## 2019-05-25 NOTE — PROGRESS NOTES
ID bands verified by this RN. Discharge instructions reviewed with patient and signed by patient. Follow up appointments and prescriptions reviewed with patient and prescriptions given to patient. All questions addressed at this time. Instructed to press call light when infant strapped in car seat for car seat check.

## 2019-05-25 NOTE — PROGRESS NOTES
Assessment completed.  Patient progressing according to plan of care.  Patient encouraged to call for any needs.  Discussed pain management.  Bonding with infant, FOB at bedside.

## 2019-08-29 ENCOUNTER — APPOINTMENT (OUTPATIENT)
Dept: RADIOLOGY | Facility: MEDICAL CENTER | Age: 29
End: 2019-08-29
Attending: PHYSICIAN ASSISTANT
Payer: MEDICAID

## 2019-12-11 ENCOUNTER — OFFICE VISIT (OUTPATIENT)
Dept: URGENT CARE | Facility: PHYSICIAN GROUP | Age: 29
End: 2019-12-11

## 2019-12-11 VITALS
WEIGHT: 144 LBS | HEIGHT: 68 IN | SYSTOLIC BLOOD PRESSURE: 114 MMHG | RESPIRATION RATE: 18 BRPM | TEMPERATURE: 97.5 F | BODY MASS INDEX: 21.82 KG/M2 | OXYGEN SATURATION: 95 % | HEART RATE: 65 BPM | DIASTOLIC BLOOD PRESSURE: 72 MMHG

## 2019-12-11 DIAGNOSIS — H69.93 EUSTACHIAN TUBE DYSFUNCTION, BILATERAL: ICD-10-CM

## 2019-12-11 DIAGNOSIS — H92.01 OTALGIA OF RIGHT EAR: ICD-10-CM

## 2019-12-11 PROCEDURE — 99213 OFFICE O/P EST LOW 20 MIN: CPT | Performed by: PHYSICIAN ASSISTANT

## 2019-12-11 ASSESSMENT — ENCOUNTER SYMPTOMS
SORE THROAT: 0
HEADACHES: 0
COUGH: 0
RHINORRHEA: 1

## 2019-12-12 NOTE — PROGRESS NOTES
Subjective:   Lucy Gonzalez is a 29 y.o. female who presents for Otalgia (R ear, gets clogged up with wax often xfew days)        Waxy right ear. Worried it is becoming infected.    Otalgia    There is pain in the right ear. This is a new problem. The current episode started in the past 7 days. The problem occurs constantly. The problem has been unchanged. There has been no fever. The pain is mild. Associated symptoms include rhinorrhea. Pertinent negatives include no coughing, ear discharge, headaches, hearing loss or sore throat. She has tried nothing for the symptoms. The treatment provided no relief. There is no history of a chronic ear infection or hearing loss.     Review of Systems   HENT: Positive for ear pain and rhinorrhea. Negative for ear discharge, hearing loss and sore throat.    Respiratory: Negative for cough.    Neurological: Negative for headaches.       PMH:  has no past medical history of Asthma, Diabetes ([facility]), Hemorrhagic disorder ([facility]), Hypertension, or Stroke ([facility]).  MEDS:   Current Outpatient Medications:   •  ibuprofen (MOTRIN) 600 MG Tab, Take 1 Tab by mouth every 6 hours as needed (For cramping after delivery; do not give if patient is receiving ketorolac (Toradol)). (Patient not taking: Reported on 12/11/2019), Disp: 30 Tab, Rfl: 1  •  amoxicillin (AMOXIL) 250 MG Cap, Take 250 mg by mouth 3 times a day., Disp: , Rfl:   •  docusate sodium (COLACE) 100 MG Cap, Take 100 mg by mouth 2 times a day., Disp: , Rfl:   •  Prenatal MV-Min-Fe Fum-FA-DHA (PRENATAL 1 PO), Take  by mouth., Disp: , Rfl:   •  tamsulosin (FLOMAX) 0.4 MG capsule, Take 1 Cap by mouth every day. (Patient not taking: Reported on 12/11/2019), Disp: 30 Cap, Rfl: 0  ALLERGIES:   Allergies   Allergen Reactions   • Bactrim Ds      SURGHX: History reviewed. No pertinent surgical history.  SOCHX:  reports that she has never smoked. She has never used smokeless tobacco. She reports that she does not drink alcohol or use  "drugs.  FH: Family history was reviewed, no pertinent findings to report   Objective:   /72 (BP Location: Right arm, Patient Position: Sitting, BP Cuff Size: Adult)   Pulse 65   Temp 36.4 °C (97.5 °F) (Temporal)   Resp 18   Ht 1.715 m (5' 7.5\")   Wt 65.3 kg (144 lb)   SpO2 95%   BMI 22.22 kg/m²   Physical Exam  Vitals signs reviewed.   Constitutional:       General: She is not in acute distress.     Appearance: Normal appearance. She is well-developed. She is not toxic-appearing.   HENT:      Head: Normocephalic and atraumatic.      Right Ear: External ear normal. A middle ear effusion is present. Tympanic membrane is not injected or erythematous.      Left Ear: External ear normal. A middle ear effusion is present. Tympanic membrane is not injected or erythematous.      Nose: Nose normal. No mucosal edema or rhinorrhea.      Mouth/Throat:      Lips: Pink.      Mouth: Mucous membranes are moist.      Pharynx: Oropharynx is clear.   Eyes:      General: Lids are normal.      Conjunctiva/sclera: Conjunctivae normal.   Neck:      Musculoskeletal: Neck supple.   Cardiovascular:      Rate and Rhythm: Normal rate and regular rhythm.   Pulmonary:      Effort: Pulmonary effort is normal. No respiratory distress.      Breath sounds: Normal breath sounds.   Musculoskeletal:      Comments: Normal range of motion. Exhibits no edema and no tenderness.    Skin:     General: Skin is warm and dry.      Capillary Refill: Capillary refill takes less than 2 seconds.   Neurological:      Mental Status: She is alert and oriented to person, place, and time.      Cranial Nerves: No cranial nerve deficit.      Sensory: No sensory deficit.   Psychiatric:         Speech: Speech normal.         Behavior: Behavior normal.         Thought Content: Thought content normal.         Judgment: Judgment normal.           Assessment/Plan:   1. Eustachian tube dysfunction, bilateral    2. Otalgia of right ear    Start flonase. May also try " sudafed. Monitor sx- new or worsening sx RTC for reevaluation.    Differential diagnosis, natural history, supportive care, and indications for immediate follow-up discussed.

## 2020-02-17 ENCOUNTER — OFFICE VISIT (OUTPATIENT)
Dept: URGENT CARE | Facility: PHYSICIAN GROUP | Age: 30
End: 2020-02-17

## 2020-02-17 VITALS
DIASTOLIC BLOOD PRESSURE: 76 MMHG | WEIGHT: 133 LBS | OXYGEN SATURATION: 98 % | BODY MASS INDEX: 20.52 KG/M2 | SYSTOLIC BLOOD PRESSURE: 104 MMHG | RESPIRATION RATE: 18 BRPM | HEART RATE: 108 BPM | TEMPERATURE: 98.3 F

## 2020-02-17 DIAGNOSIS — R39.89 SUSPECTED UTI: ICD-10-CM

## 2020-02-17 LAB
APPEARANCE UR: CLEAR
BILIRUB UR STRIP-MCNC: NORMAL MG/DL
COLOR UR AUTO: YELLOW
GLUCOSE UR STRIP.AUTO-MCNC: NORMAL MG/DL
INT CON NEG: NEGATIVE
INT CON POS: POSITIVE
KETONES UR STRIP.AUTO-MCNC: NORMAL MG/DL
LEUKOCYTE ESTERASE UR QL STRIP.AUTO: NORMAL
NITRITE UR QL STRIP.AUTO: NORMAL
PH UR STRIP.AUTO: 6 [PH] (ref 5–8)
POC URINE PREGNANCY TEST: NORMAL
PROT UR QL STRIP: NORMAL MG/DL
RBC UR QL AUTO: NORMAL
SP GR UR STRIP.AUTO: 1.02
UROBILINOGEN UR STRIP-MCNC: 0.2 MG/DL

## 2020-02-17 PROCEDURE — 81002 URINALYSIS NONAUTO W/O SCOPE: CPT | Performed by: PHYSICIAN ASSISTANT

## 2020-02-17 PROCEDURE — 99214 OFFICE O/P EST MOD 30 MIN: CPT | Performed by: PHYSICIAN ASSISTANT

## 2020-02-17 PROCEDURE — 81025 URINE PREGNANCY TEST: CPT | Performed by: PHYSICIAN ASSISTANT

## 2020-02-17 RX ORDER — CEFDINIR 300 MG/1
300 CAPSULE ORAL EVERY 12 HOURS
Qty: 10 CAP | Refills: 0 | Status: SHIPPED | OUTPATIENT
Start: 2020-02-17 | End: 2020-02-22

## 2020-02-18 ASSESSMENT — ENCOUNTER SYMPTOMS
DIARRHEA: 0
NAUSEA: 0
ABDOMINAL PAIN: 1
CONSTIPATION: 0
CHILLS: 0
FEVER: 0
VOMITING: 0
FLANK PAIN: 0

## 2020-09-17 NOTE — PROGRESS NOTES
"Lucy Gonzalez is a 28 y.o.  at 26w2d here today for obstetrical visit.  Patient is with complaints.   Kidney stones.  Patient was having excruciating pain last night and this morning like she was passing a stone.  Patient has had kidney stones in the past and knows symptoms.  Some nausea no fever.  After being in triage is starting to feel better but c/o bladder spasm.  She reports good fetal movement.  She denies vaginal bleeding.  She denies rupture of membranes.  She denies contractions.      does not have a problem list on file.    Past medical history is reviewed and updated  Past surgical history is reviewed and updated  Medications reviewed and updated  Allergies reviewed and updated  Social history reviewed and updated  Family history reviewed and updated    BP (P) 105/61   Pulse (P) 87   Temp (P) 36.9 °C (98.4 °F)   Ht (P) 1.702 m (5' 7\")   Wt (P) 71.7 kg (158 lb)   BMI (P) 24.75 kg/m²   WDWN mild distress  abd soft and nontender  Ext - CCE  Mild left sided CVAT     good variability appropriate for gest age  toco no contractions      A/P IUP at 26w2d  D/c with Urology, will see her in AM tomorrow  Oxycodone for pain management  F/U in 1 weeks with OB GYN    " daily. Supplement       FOLIC ACID Take 1 mg by mouth daily. Indications: Folic Acid Supplementation      Cholecalciferol (VITAMIN D PO) Take 50,000 Units by mouth Twice a week (Monday and Thursday)       therapeutic multivitamin-minerals (THERAGRAN-M) tablet Take 1 tablet by mouth daily. Indications: Vitamin Deficiency      LACTOBACILLUS ACIDOPHILUS Take 1 tablet by mouth 2 times daily. Supplement       levalbuterol (XOPENEX) 0.63 MG/3ML nebulization Take  by nebulization. Inhale Nebulizer 5-15 minutes prior to Abelcet   Indications: Lung Transplant      predniSONE (DELTASONE) 5 MG tablet Take 5 mg by mouth daily Indications: Lung Transplant Take 1 Tablet Daily with food. No current facility-administered medications for this visit. Subjective:      Review of Systems   Constitutional: Negative for appetite change, chills, diaphoresis, fatigue and fever. HENT: Negative for congestion, ear discharge, ear pain, postnasal drip, rhinorrhea, sinus pressure, sore throat, trouble swallowing and voice change. Respiratory: Negative for cough, chest tightness, shortness of breath and wheezing. Cardiovascular: Negative for chest pain, palpitations and leg swelling. Gastrointestinal: Negative for abdominal pain, constipation, diarrhea, nausea and vomiting. Musculoskeletal: Negative for arthralgias, back pain, joint swelling, myalgias, neck pain and neck stiffness. Skin: Negative for rash. Neurological: Negative for dizziness, syncope, weakness, light-headedness, numbness and headaches.        Objective:     /68 (Site: Left Upper Arm, Position: Sitting, Cuff Size: Medium Adult)   Pulse 62   Temp 97.3 °F (36.3 °C) (Skin)   Resp 16   Ht 5' 9\" (1.753 m)   Wt 183 lb 2 oz (83.1 kg)   BMI 27.04 kg/m²   BP Readings from Last 3 Encounters:   09/17/20 108/68   06/29/20 114/66   03/12/20 112/64     Wt Readings from Last 3 Encounters:   09/17/20 183 lb 2 oz (83.1 kg)   06/29/20 175 lb 6.4 oz (79.6 kg)   03/12/20 181 lb 9.6 oz (82.4 kg)       Physical Exam  Vitals signs reviewed. Constitutional:       Appearance: He is well-developed. HENT:      Head: Normocephalic and atraumatic. Right Ear: External ear normal.      Left Ear: External ear normal.      Nose: Nose normal.   Eyes:      General: No scleral icterus. Conjunctiva/sclera: Conjunctivae normal.      Pupils: Pupils are equal, round, and reactive to light. Neck:      Musculoskeletal: Normal range of motion and neck supple. Thyroid: No thyromegaly. Vascular: No JVD. Cardiovascular:      Rate and Rhythm: Normal rate and regular rhythm. Heart sounds: No murmur. No friction rub. Pulmonary:      Effort: Pulmonary effort is normal.      Breath sounds: Normal breath sounds. No wheezing or rales. Chest:      Chest wall: No tenderness. Abdominal:      General: Bowel sounds are normal.      Palpations: Abdomen is soft. There is no mass. Tenderness: There is no abdominal tenderness. Lymphadenopathy:      Cervical: No cervical adenopathy. Skin:     Findings: No rash. Neurological:      Mental Status: He is alert and oriented to person, place, and time. Psychiatric:         Behavior: Behavior is cooperative. Assessment:         Diagnosis Orders   1. Essential hypertension     2. Hyperlipidemia, unspecified hyperlipidemia type     3. Paroxysmal atrial fibrillation (HCC)     4. H/O lung transplant ([facility]         Plan:      Medications Prescribed:  No orders of the defined types were placed in this encounter. Orders Placed:  No orders of the defined types were placed in this encounter. Return in about 3 months (around 12/17/2020). Discussed use, benefit, and side effects of prescribedmedications. All patient questions answered. Pt voiced understanding. Instructedto continue current medications, diet and exercise. Patient agreed with treatmentplan.

## 2020-10-10 ENCOUNTER — OFFICE VISIT (OUTPATIENT)
Dept: URGENT CARE | Facility: CLINIC | Age: 30
End: 2020-10-10

## 2020-10-10 VITALS
TEMPERATURE: 97.3 F | HEIGHT: 67 IN | RESPIRATION RATE: 12 BRPM | OXYGEN SATURATION: 99 % | DIASTOLIC BLOOD PRESSURE: 60 MMHG | BODY MASS INDEX: 19.62 KG/M2 | WEIGHT: 125 LBS | HEART RATE: 100 BPM | SYSTOLIC BLOOD PRESSURE: 100 MMHG

## 2020-10-10 DIAGNOSIS — L02.01 FACIAL ABSCESS: ICD-10-CM

## 2020-10-10 PROCEDURE — 99214 OFFICE O/P EST MOD 30 MIN: CPT | Performed by: PHYSICIAN ASSISTANT

## 2020-10-10 RX ORDER — CLINDAMYCIN HYDROCHLORIDE 300 MG/1
300 CAPSULE ORAL 3 TIMES DAILY
Qty: 21 CAP | Refills: 0 | Status: SHIPPED | OUTPATIENT
Start: 2020-10-10 | End: 2020-10-10 | Stop reason: SDUPTHER

## 2020-10-10 RX ORDER — CLINDAMYCIN HYDROCHLORIDE 300 MG/1
300 CAPSULE ORAL 3 TIMES DAILY
Qty: 21 CAP | Refills: 0 | Status: SHIPPED | OUTPATIENT
Start: 2020-10-10 | End: 2021-12-24

## 2020-10-10 ASSESSMENT — ENCOUNTER SYMPTOMS
SHORTNESS OF BREATH: 0
FEVER: 0
VOMITING: 0
SORE THROAT: 0
COUGH: 0
RHINORRHEA: 0
DIARRHEA: 0
FATIGUE: 0

## 2020-10-10 ASSESSMENT — FIBROSIS 4 INDEX: FIB4 SCORE: 0.91

## 2020-10-11 NOTE — PROGRESS NOTES
Subjective:      Lucy Gonzalez is a 30 y.o. female who presents with Insect Bite (x 1 week ,  possible infected pimple . per patient not sure . redness right cheek )            Painful draining lesion on right cheek with surrounding redness and tenderness.    Rash  This is a new problem. The current episode started in the past 7 days. The problem is unchanged. The affected locations include the face. The rash is characterized by swelling, redness, pain and draining. She was exposed to nothing. Associated symptoms include facial edema. Pertinent negatives include no congestion, cough, diarrhea, fatigue, fever, joint pain, rhinorrhea, shortness of breath, sore throat or vomiting. Past treatments include antibiotic cream. The treatment provided no relief.       PMH:  has no past medical history of Asthma, Diabetes ([facility]), Hemorrhagic disorder (HCC), Hypertension, or Stroke ([facility]).  MEDS:   Current Outpatient Medications:   •  clindamycin (CLEOCIN) 300 MG Cap, Take 1 Cap by mouth 3 times a day., Disp: 21 Cap, Rfl: 0  •  ibuprofen (MOTRIN) 600 MG Tab, Take 1 Tab by mouth every 6 hours as needed (For cramping after delivery; do not give if patient is receiving ketorolac (Toradol)). (Patient not taking: Reported on 12/11/2019), Disp: 30 Tab, Rfl: 1  •  amoxicillin (AMOXIL) 250 MG Cap, Take 250 mg by mouth 3 times a day., Disp: , Rfl:   •  docusate sodium (COLACE) 100 MG Cap, Take 100 mg by mouth 2 times a day., Disp: , Rfl:   •  Prenatal MV-Min-Fe Fum-FA-DHA (PRENATAL 1 PO), Take  by mouth., Disp: , Rfl:   •  tamsulosin (FLOMAX) 0.4 MG capsule, Take 1 Cap by mouth every day. (Patient not taking: Reported on 12/11/2019), Disp: 30 Cap, Rfl: 0  ALLERGIES:   Allergies   Allergen Reactions   • Bactrim Ds      SURGHX: No past surgical history on file.  SOCHX:  reports that she has never smoked. She has never used smokeless tobacco. She reports that she does not drink alcohol or use drugs.  FH: family history is not on  "file.    Review of Systems   Constitutional: Negative for fatigue and fever.   HENT: Negative for congestion, rhinorrhea and sore throat.    Respiratory: Negative for cough and shortness of breath.    Gastrointestinal: Negative for diarrhea and vomiting.   Musculoskeletal: Negative for joint pain.   Skin: Positive for rash.   All other systems reviewed and are negative.      Medications, Allergies, and current problem list reviewed today in Epic     Objective:     /60   Pulse 100   Temp 36.3 °C (97.3 °F)   Resp 12   Ht 1.702 m (5' 7\")   Wt 56.7 kg (125 lb)   SpO2 99%   BMI 19.58 kg/m²      Physical Exam  Vitals signs and nursing note reviewed.   Constitutional:       General: She is not in acute distress.     Appearance: She is well-developed. She is not diaphoretic.   HENT:      Head: Normocephalic and atraumatic.        Comments: Painful, erythematous, draining lesion on the right cheek.  Surrounding erythema.  Some localized pruritus.  No inner mouth involvement.     Right Ear: Tympanic membrane, ear canal and external ear normal.      Left Ear: Tympanic membrane, ear canal and external ear normal.      Nose: No congestion.      Mouth/Throat:      Pharynx: Oropharynx is clear. No oropharyngeal exudate or posterior oropharyngeal erythema.   Eyes:      Conjunctiva/sclera: Conjunctivae normal.   Neck:      Musculoskeletal: Normal range of motion and neck supple.   Cardiovascular:      Rate and Rhythm: Normal rate and regular rhythm.      Heart sounds: Normal heart sounds.   Pulmonary:      Effort: Pulmonary effort is normal. No respiratory distress.      Breath sounds: Normal breath sounds. No wheezing.   Lymphadenopathy:      Cervical: No cervical adenopathy.   Skin:     General: Skin is warm and dry.   Neurological:      Mental Status: She is alert and oriented to person, place, and time.   Psychiatric:         Behavior: Behavior normal.         Thought Content: Thought content normal.         " Judgment: Judgment normal.                 Assessment/Plan:         1. Facial abscess  clindamycin (CLEOCIN) 300 MG Cap    DISCONTINUED: clindamycin (CLEOCIN) 300 MG Cap     Draining painful lesion.  Most likely bacterial etiology.  Less likely viral.  There does appear to be a superimposed allergic response to the Band-Aid.  Vital signs normal and patient otherwise asymptomatic.  No regional adenopathy noted.  Antibiotics, warm compresses, wound care discussed.  OTC meds and conservative measures as discussed    Return to clinic or go to ED if symptoms worsen or persist. Indications for ED discussed at length. Patient voices understanding. Follow-up with your primary care provider in 3-5 days. Red flags discussed. All side effects of medication discussed including allergic response, GI upset, tendon injury, etc.    Please note that this dictation was created using voice recognition software. I have made every reasonable attempt to correct obvious errors, but I expect that there are errors of grammar and possibly content that I did not discover before finalizing the note.

## 2021-06-15 NOTE — PROGRESS NOTES
Subjective:   Lucy Gonzalez  is a 29 y.o. female who presents for UTI (poss uti x1 wk)    This is a new problem.  Patient presents to urgent care with 1 week history of suprapubic abdominal discomfort.  Patient denies any true dysuria but does admit to some urinary urgency and frequency.  Patient does have a history of recurrent kidney stones.  She states she has noted some blood in her urine at times.  Denies fever or chills.  Denies any associated nausea, vomiting, diarrhea or change in bowels.  However, she does admit that her pain seems somewhat worse with a bowel movement.  Patient is currently uninsured and would like to limit her work-up if possible.      UTI   Associated symptoms include abdominal pain. Pertinent negatives include no chills, fever, nausea or vomiting.     Review of Systems   Constitutional: Negative for chills and fever.   Gastrointestinal: Positive for abdominal pain. Negative for constipation, diarrhea, nausea and vomiting.   Genitourinary: Positive for frequency, hematuria and urgency. Negative for dysuria and flank pain.   All other systems reviewed and are negative.    Allergies   Allergen Reactions   • Bactrim Ds      Reviewed past medical, surgical , social and family history.  Reviewed prescription and over-the-counter medications with patient and electronic health record today.     Objective:   /76 (BP Location: Left arm, Patient Position: Sitting, BP Cuff Size: Small adult)   Pulse (!) 108   Temp 36.8 °C (98.3 °F) (Temporal)   Resp 18   Wt 60.3 kg (133 lb)   SpO2 98%   BMI 20.52 kg/m²   Physical Exam  Vitals signs reviewed.   Constitutional:       General: She is not in acute distress.     Appearance: She is well-developed. She is not ill-appearing or toxic-appearing.   HENT:      Head: Normocephalic and atraumatic.      Right Ear: Tympanic membrane, ear canal and external ear normal.      Left Ear: Tympanic membrane, ear canal and external ear normal.      Nose:  Nose normal.      Mouth/Throat:      Lips: Pink. No lesions.      Mouth: Mucous membranes are moist.      Pharynx: Oropharynx is clear. Uvula midline. No oropharyngeal exudate.   Eyes:      General: Lids are normal.      Extraocular Movements: Extraocular movements intact.      Conjunctiva/sclera: Conjunctivae normal.      Pupils: Pupils are equal, round, and reactive to light.   Neck:      Musculoskeletal: Normal range of motion and neck supple.   Cardiovascular:      Rate and Rhythm: Normal rate and regular rhythm.      Heart sounds: Normal heart sounds. No murmur. No friction rub. No gallop.    Pulmonary:      Effort: Pulmonary effort is normal. No respiratory distress.      Breath sounds: Normal breath sounds.   Abdominal:      General: Bowel sounds are normal. There is no distension.      Palpations: Abdomen is soft. There is no mass.      Tenderness: There is abdominal tenderness in the suprapubic area. There is no right CVA tenderness, left CVA tenderness, guarding or rebound.   Musculoskeletal: Normal range of motion.         General: No tenderness or deformity.   Lymphadenopathy:      Head:      Right side of head: No submental, submandibular or tonsillar adenopathy.      Left side of head: No submental, submandibular or tonsillar adenopathy.      Cervical: No cervical adenopathy.      Upper Body:      Right upper body: No supraclavicular adenopathy.      Left upper body: No supraclavicular adenopathy.   Skin:     General: Skin is warm and dry.      Findings: No rash.   Neurological:      Mental Status: She is alert and oriented to person, place, and time.      Cranial Nerves: Cranial nerves are intact. No cranial nerve deficit.      Sensory: Sensation is intact. No sensory deficit.      Motor: Motor function is intact.      Coordination: Coordination is intact. Coordination normal.      Gait: Gait is intact.   Psychiatric:         Attention and Perception: Attention normal.         Mood and Affect: Mood  and affect normal.         Speech: Speech normal.         Behavior: Behavior normal. Behavior is cooperative.         Thought Content: Thought content normal.         Judgment: Judgment normal.           Assessment/Plan:   1. Suspected UTI  - POCT Urinalysis  - POCT PREGNANCY  - cefdinir (OMNICEF) 300 MG Cap; Take 1 Cap by mouth every 12 hours for 5 days.  Dispense: 10 Cap; Refill: 0    Results for orders placed or performed in visit on 02/17/20   POCT Urinalysis   Result Value Ref Range    POC Color yellow Negative    POC Appearance clear Negative    POC Leukocyte Esterase tr Negative    POC Nitrites neg Negative    POC Urobiligen 0.2 Negative (0.2) mg/dL    POC Protein neg Negative mg/dL    POC Urine PH 6.0 5.0 - 8.0    POC Blood neg Negative    POC Specific Gravity 1.020 <1.005 - >1.030    POC Ketones neg Negative mg/dL    POC Bilirubin neg Negative mg/dL    POC Glucose neg Negative mg/dL   POCT PREGNANCY   Result Value Ref Range    POC Urine Pregnancy Test neg Negative    Internal Control Positive Positive     Internal Control Negative Negative        Urinalysis is not overwhelmingly convincing for UTI.  However, patient would like to avoid additional cost of urine culture.  Patient will be placed on cefdinir for possible UTI.  If not improving in approximately 3 days recommend reevaluation at which time consideration could be given to urine culture and/or further evaluation for pelvic exam, etc.  If hematuria persists, recommend follow-up with urology.      Differential diagnosis, natural history, supportive care, and indications for immediate follow-up discussed.     Red flag warning symptoms and strict ER/follow-up precautions given.  The patient demonstrated a good understanding and agreed with the treatment plan.  Please note that this note was created using voice recognition speech to text software. Every effort has been made to correct obvious errors.  However, I expect there are errors of grammar and  possibly context that were not discovered prior to finalizing the note  BEE Sharif PA-C   Abdomen soft, non-tender, no rebound, no guarding.

## 2021-12-24 ENCOUNTER — OFFICE VISIT (OUTPATIENT)
Dept: URGENT CARE | Facility: PHYSICIAN GROUP | Age: 31
End: 2021-12-24

## 2021-12-24 ENCOUNTER — HOSPITAL ENCOUNTER (OUTPATIENT)
Facility: MEDICAL CENTER | Age: 31
End: 2021-12-24
Attending: PHYSICIAN ASSISTANT

## 2021-12-24 VITALS
OXYGEN SATURATION: 100 % | TEMPERATURE: 97.2 F | RESPIRATION RATE: 16 BRPM | BODY MASS INDEX: 21.52 KG/M2 | HEART RATE: 111 BPM | HEIGHT: 68 IN | WEIGHT: 142 LBS | SYSTOLIC BLOOD PRESSURE: 112 MMHG | DIASTOLIC BLOOD PRESSURE: 70 MMHG

## 2021-12-24 DIAGNOSIS — R10.2 PELVIC PAIN: ICD-10-CM

## 2021-12-24 DIAGNOSIS — N89.8 VAGINAL DISCHARGE: ICD-10-CM

## 2021-12-24 DIAGNOSIS — R39.15 URINARY URGENCY: ICD-10-CM

## 2021-12-24 LAB
APPEARANCE UR: NORMAL
BILIRUB UR STRIP-MCNC: NEGATIVE MG/DL
COLOR UR AUTO: YELLOW
GLUCOSE UR STRIP.AUTO-MCNC: NEGATIVE MG/DL
INT CON NEG: NORMAL
INT CON POS: NORMAL
KETONES UR STRIP.AUTO-MCNC: NORMAL MG/DL
LEUKOCYTE ESTERASE UR QL STRIP.AUTO: NEGATIVE
NITRITE UR QL STRIP.AUTO: NEGATIVE
PH UR STRIP.AUTO: 6 [PH] (ref 5–8)
POC URINE PREGNANCY TEST: NEGATIVE
PROT UR QL STRIP: NEGATIVE MG/DL
RBC UR QL AUTO: NEGATIVE
SP GR UR STRIP.AUTO: 1.02
UROBILINOGEN UR STRIP-MCNC: 1 MG/DL

## 2021-12-24 PROCEDURE — 81002 URINALYSIS NONAUTO W/O SCOPE: CPT | Performed by: PHYSICIAN ASSISTANT

## 2021-12-24 PROCEDURE — 81025 URINE PREGNANCY TEST: CPT | Performed by: PHYSICIAN ASSISTANT

## 2021-12-24 PROCEDURE — 87480 CANDIDA DNA DIR PROBE: CPT

## 2021-12-24 PROCEDURE — 99213 OFFICE O/P EST LOW 20 MIN: CPT | Performed by: PHYSICIAN ASSISTANT

## 2021-12-24 PROCEDURE — 87660 TRICHOMONAS VAGIN DIR PROBE: CPT

## 2021-12-24 PROCEDURE — 87510 GARDNER VAG DNA DIR PROBE: CPT

## 2021-12-24 ASSESSMENT — ENCOUNTER SYMPTOMS
CHILLS: 0
DIARRHEA: 0
NAUSEA: 0
ABDOMINAL PAIN: 0
VOMITING: 0
FEVER: 0
HEADACHES: 0
FLANK PAIN: 0

## 2021-12-24 NOTE — PROGRESS NOTES
"Subjective:   Lucy Gonzalez is a 31 y.o. female who presents for UTI (Pelvic pain, bloating, strong odor, vaginal discharge, hx kidney stones. Onset 4 days.  )      HPI  31 y.o. female presents to urgent care with new problem to provider of pelvic pain, malodorous urine, frequency, and vaginal discharge onset 4 days ago.  Patient has history of interstitial cystitis followed previously by urology.  She had taken oxybutynin and Uribel in the past, however has not taken these medications in over a year. She denies hematuria, dysuria, fevers, flank pain, abdominal pain, or vomiting.  History of kidney stones. Denies other associated aggravating or alleviating factors.     Review of Systems   Constitutional: Negative for chills and fever.   Gastrointestinal: Negative for abdominal pain, diarrhea, nausea and vomiting.   Genitourinary: Positive for frequency and urgency. Negative for dysuria, flank pain and hematuria.   Neurological: Negative for headaches.       There is no problem list on file for this patient.    History reviewed. No pertinent surgical history.  Social History     Tobacco Use   • Smoking status: Never Smoker   • Smokeless tobacco: Never Used   Vaping Use   • Vaping Use: Never used   Substance Use Topics   • Alcohol use: No   • Drug use: No      History reviewed. No pertinent family history.   (Allergies, Medications, & Tobacco/Substance Use were reconciled by the Medical Assistant and reviewed by myself. The family history is prepopulated)     Objective:     /70 (BP Location: Left arm, Patient Position: Sitting, BP Cuff Size: Adult)   Pulse (!) 111   Temp 36.2 °C (97.2 °F) (Temporal)   Resp 16   Ht 1.727 m (5' 8\")   Wt 64.4 kg (142 lb)   SpO2 100%   BMI 21.59 kg/m²     Physical Exam  Vitals reviewed.   Constitutional:       General: She is not in acute distress.     Appearance: Normal appearance. She is well-developed. She is not ill-appearing.   HENT:      Head: Normocephalic and " atraumatic.   Eyes:      Conjunctiva/sclera: Conjunctivae normal.   Cardiovascular:      Rate and Rhythm: Normal rate and regular rhythm.      Heart sounds: Normal heart sounds.   Pulmonary:      Effort: Pulmonary effort is normal. No respiratory distress.      Breath sounds: Normal breath sounds.   Abdominal:      General: Abdomen is flat. Bowel sounds are normal. There is no distension.      Palpations: Abdomen is soft.      Tenderness: There is no abdominal tenderness. There is no right CVA tenderness or left CVA tenderness.      Comments: Minimal suprapubic abdominal tenderness with no focal tenderness, rebound, or guarding.   Musculoskeletal:      Cervical back: Normal range of motion and neck supple.   Skin:     General: Skin is warm and dry.      Findings: No erythema.   Neurological:      Mental Status: She is alert and oriented to person, place, and time.   Psychiatric:         Mood and Affect: Mood normal.         Behavior: Behavior normal.         Thought Content: Thought content normal.         Judgment: Judgment normal.         Assessment/Plan:     1. Urinary urgency  POCT Urinalysis    POCT Pregnancy   2. Vaginal discharge  VAGINAL PATHOGENS DNA PANEL   3. Pelvic pain       Results for orders placed or performed in visit on 12/24/21   POCT Urinalysis   Result Value Ref Range    POC Color YELLOW Negative    POC Appearance TURBID Negative    POC Leukocyte Esterase NEGATIVE Negative    POC Nitrites NEGATIVE Negative    POC Urobiligen 1.0 Negative (0.2) mg/dL    POC Protein NEGATIVE Negative mg/dL    POC Urine PH 6.0 5.0 - 8.0    POC Blood NEGATIVE Negative    POC Specific Gravity 1.025 <1.005 - >1.030    POC Ketones TRACE Negative mg/dL    POC Bilirubin NEGATIVE Negative mg/dL    POC Glucose NEGATIVE Negative mg/dL   POCT Pregnancy   Result Value Ref Range    POC Urine Pregnancy Test NEGATIVE Negative    Internal Control Positive Valid     Internal Control Negative Valid      Patient presents with  symptoms consistent with history of interstitial cystitis.  There are no signs of bacterial infection on review of urinalysis done in clinic.  Patient denies significant flank pain or hematuria.  She denies exposure or concern for STDs.  She does report an increase in normal vaginal discharge.  DNA vaginal pathogen sent to rule out bacterial vaginosis.  I recommend that patient reestablish care with her former urologist for further evaluation if symptoms persist.  Drink plenty of fluids.     Differential diagnosis, natural history, supportive care, and indications for immediate follow-up discussed.    Advised the patient to follow-up with the primary care physician for recheck, reevaluation, and consideration of further management.  Patient verbalized understanding of treatment plan and has no further questions regarding care.     I personally reviewed prior external notes and test results pertinent to today's visit.     Please note that this dictation was created using voice recognition software. I have made a reasonable attempt to correct obvious errors, but I expect that there are errors of grammar and possibly content that I did not discover before finalizing the note.    This note was electronically signed by Carmen Colon PA-C

## 2021-12-25 LAB
CANDIDA DNA VAG QL PROBE+SIG AMP: NEGATIVE
G VAGINALIS DNA VAG QL PROBE+SIG AMP: POSITIVE
T VAGINALIS DNA VAG QL PROBE+SIG AMP: NEGATIVE

## 2021-12-31 ENCOUNTER — TELEPHONE (OUTPATIENT)
Dept: URGENT CARE | Facility: CLINIC | Age: 31
End: 2021-12-31

## 2021-12-31 DIAGNOSIS — N76.0 BACTERIAL VAGINOSIS: ICD-10-CM

## 2021-12-31 DIAGNOSIS — B96.89 BACTERIAL VAGINOSIS: ICD-10-CM

## 2021-12-31 RX ORDER — METRONIDAZOLE 500 MG/1
500 TABLET ORAL 2 TIMES DAILY
Qty: 14 TABLET | Refills: 0 | Status: SHIPPED | OUTPATIENT
Start: 2021-12-31 | End: 2022-01-07

## 2022-10-21 ENCOUNTER — NON-PROVIDER VISIT (OUTPATIENT)
Dept: OCCUPATIONAL MEDICINE | Facility: CLINIC | Age: 32
End: 2022-10-21

## 2022-10-21 DIAGNOSIS — Z11.1 ENCOUNTER FOR PPD TEST: Primary | ICD-10-CM

## 2022-10-21 PROCEDURE — 86580 TB INTRADERMAL TEST: CPT | Performed by: NURSE PRACTITIONER

## 2022-10-23 ENCOUNTER — NON-PROVIDER VISIT (OUTPATIENT)
Dept: URGENT CARE | Facility: CLINIC | Age: 32
End: 2022-10-23

## 2022-10-23 LAB — TB WHEAL 3D P 5 TU DIAM: NORMAL MM

## 2022-11-19 ENCOUNTER — OFFICE VISIT (OUTPATIENT)
Dept: URGENT CARE | Facility: PHYSICIAN GROUP | Age: 32
End: 2022-11-19

## 2022-11-19 VITALS
HEART RATE: 78 BPM | RESPIRATION RATE: 16 BRPM | DIASTOLIC BLOOD PRESSURE: 72 MMHG | TEMPERATURE: 98.4 F | SYSTOLIC BLOOD PRESSURE: 118 MMHG | OXYGEN SATURATION: 99 %

## 2022-11-19 DIAGNOSIS — L08.9 SKIN INFECTION: ICD-10-CM

## 2022-11-19 DIAGNOSIS — Z76.89 ENCOUNTER TO ESTABLISH CARE: ICD-10-CM

## 2022-11-19 DIAGNOSIS — B37.31 VAGINAL YEAST INFECTION: ICD-10-CM

## 2022-11-19 PROCEDURE — 99213 OFFICE O/P EST LOW 20 MIN: CPT | Performed by: NURSE PRACTITIONER

## 2022-11-19 RX ORDER — METHYLPREDNISOLONE 4 MG/1
TABLET ORAL
Qty: 21 TABLET | Refills: 0 | Status: SHIPPED | OUTPATIENT
Start: 2022-11-19 | End: 2022-11-28

## 2022-11-19 RX ORDER — FLUCONAZOLE 150 MG/1
150 TABLET ORAL DAILY
Qty: 1 TABLET | Refills: 1 | Status: SHIPPED | OUTPATIENT
Start: 2022-11-19 | End: 2022-11-20

## 2022-11-19 RX ORDER — CEPHALEXIN 500 MG/1
500 CAPSULE ORAL 4 TIMES DAILY
Qty: 28 CAPSULE | Refills: 0 | Status: SHIPPED | OUTPATIENT
Start: 2022-11-19 | End: 2022-11-25

## 2022-11-19 ASSESSMENT — ENCOUNTER SYMPTOMS
FEVER: 0
GASTROINTESTINAL NEGATIVE: 1
CONSTITUTIONAL NEGATIVE: 1
MUSCULOSKELETAL NEGATIVE: 1
RESPIRATORY NEGATIVE: 1
PSYCHIATRIC NEGATIVE: 1
NEUROLOGICAL NEGATIVE: 1
CARDIOVASCULAR NEGATIVE: 1
EYES NEGATIVE: 1

## 2022-11-20 ENCOUNTER — TELEPHONE (OUTPATIENT)
Dept: SCHEDULING | Facility: IMAGING CENTER | Age: 32
End: 2022-11-20

## 2022-11-20 NOTE — PROGRESS NOTES
Subjective:   Lucy Gonzalez is a 32 y.o. female who presents for Rash      Patient presents with a rash to her bilateral axillary regions.  She states that this began approximately 1 month ago.  She states it started as a small patch and then it just progressively got worse.  She has tried topical Benadryl and cortisone creams which helped at first but then did not.  She then tried a new deodorant, and rash remained present so she went back to her old deodorant.  There has been no change with this.  Patient reports that the rash is itchy, as well as she feels it is draining clear fluid on the left axillary region.  She denies any other changes to detergents, perfumes, or anything she is eating.  She cannot think of anything else that could be contributing to this.  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  She denies fever, chills, or shortness of breath with this.    Rash  This is a new problem. The current episode started more than 1 month ago. The problem has been gradually worsening since onset. The affected locations include the left axilla and right axilla. The rash is characterized by redness, swelling, itchiness and draining. She was exposed to nothing. Pertinent negatives include no fever. Past treatments include antihistamine and anti-itch cream. The treatment provided mild relief.     Review of Systems   Constitutional: Negative.  Negative for fever.   HENT: Negative.     Eyes: Negative.    Respiratory: Negative.     Cardiovascular: Negative.    Gastrointestinal: Negative.    Genitourinary: Negative.    Musculoskeletal: Negative.    Skin:  Positive for rash.        Bilateral axillary regions   Neurological: Negative.    Psychiatric/Behavioral: Negative.       Medications, Allergies, and current problem list reviewed today in Epic.     Objective:     /72 (BP Location: Left arm, Patient Position: Sitting)   Pulse  78   Temp 36.9 °C (98.4 °F) (Temporal)   Resp 16   SpO2 99%     Physical Exam  Vitals reviewed.   Constitutional:       Appearance: Normal appearance.   HENT:      Head: Normocephalic and atraumatic.      Nose: Nose normal.      Mouth/Throat:      Mouth: Mucous membranes are moist.      Pharynx: Oropharynx is clear.   Eyes:      Extraocular Movements: Extraocular movements intact.      Conjunctiva/sclera: Conjunctivae normal.      Pupils: Pupils are equal, round, and reactive to light.   Cardiovascular:      Rate and Rhythm: Normal rate and regular rhythm.   Pulmonary:      Effort: Pulmonary effort is normal.      Breath sounds: Normal breath sounds.   Abdominal:      General: Abdomen is flat.      Palpations: Abdomen is soft.   Musculoskeletal:         General: Normal range of motion.      Cervical back: Normal range of motion and neck supple.   Skin:     General: Skin is warm and dry.      Capillary Refill: Capillary refill takes less than 2 seconds.      Findings: Rash present.      Comments: Patient has a diffuse papular rash noted to both axillary areas, left greater than right.  These papules are erythematous and raised.  The right side does have an approximately 2 cm, raised, area that does appear to be weeping, and is tender to palpation.  This area is more erythematous than the other scattered papules.  The left side does not appear to have any signs of infection.   Neurological:      General: No focal deficit present.      Mental Status: She is alert.   Psychiatric:         Mood and Affect: Mood normal.         Behavior: Behavior normal.       Assessment/Plan:     Diagnosis and associated orders:     1. Skin infection  methylPREDNISolone (MEDROL DOSEPAK) 4 MG Tablet Therapy Pack    cephALEXin (KEFLEX) 500 MG Cap    fluconazole (DIFLUCAN) 150 MG tablet      2. Vaginal yeast infection  fluconazole (DIFLUCAN) 150 MG tablet      3. Encounter to establish care  Referral to establish with [facility] PCP          Comments/MDM:     Patient was given a Medrol Dosepak as well as cephalexin 500 mg 4 times daily x7 days.  Patient reports that she does get a yeast infection with antibiotics, and was prophylactically given fluconazole 150 mg.  Patient does not have a PCP at this time, and referral was placed for her to establish with PCP.  Patient will follow up with urgent care if her rash does not significantly improve or resolve in the next 7 days.  Otherwise, she will establish with primary, and follow-up with them.         Differential diagnosis, natural history, supportive care, and indications for immediate follow-up discussed.    Advised the patient to follow-up with the primary care physician for recheck, reevaluation, and consideration of further management.    Please note that this dictation was created using voice recognition software. I have made a reasonable attempt to correct obvious errors, but I expect that there are errors of grammar and possibly content that I did not discover before finalizing the note.    This note was electronically signed by RAFY Victor

## 2022-11-23 ENCOUNTER — OFFICE VISIT (OUTPATIENT)
Dept: URGENT CARE | Facility: PHYSICIAN GROUP | Age: 32
End: 2022-11-23

## 2022-11-23 VITALS
HEIGHT: 67 IN | OXYGEN SATURATION: 99 % | TEMPERATURE: 98.6 F | HEART RATE: 71 BPM | SYSTOLIC BLOOD PRESSURE: 120 MMHG | RESPIRATION RATE: 14 BRPM | DIASTOLIC BLOOD PRESSURE: 82 MMHG | WEIGHT: 133 LBS | BODY MASS INDEX: 20.88 KG/M2

## 2022-11-23 DIAGNOSIS — R21 RASH: ICD-10-CM

## 2022-11-23 PROCEDURE — 99213 OFFICE O/P EST LOW 20 MIN: CPT

## 2022-11-23 RX ORDER — CLOTRIMAZOLE AND BETAMETHASONE DIPROPIONATE 10; .64 MG/G; MG/G
1 CREAM TOPICAL 2 TIMES DAILY
Qty: 45 G | Refills: 0 | Status: SHIPPED | OUTPATIENT
Start: 2022-11-23 | End: 2022-11-25

## 2022-11-23 NOTE — PROGRESS NOTES
"Subjective:   Lucy Gonzalez is a 32 y.o. female who presents for Allergic Reaction (Severe itch, cold flashes, rash growing x1 week )      HPI:    Patient presents to urgent care with complaints of rash on her armpits. Rash started in her right arm pit  one week ago as red and inflamed. Denies blisters, preceding URI like symptoms. Patient believes it has spread to her left arm pit.   Onset was one week ago.     ROS As above in HPI    Medications:    Current Outpatient Medications on File Prior to Visit   Medication Sig Dispense Refill    methylPREDNISolone (MEDROL DOSEPAK) 4 MG Tablet Therapy Pack Follow schedule on package instructions. 21 Tablet 0    cephALEXin (KEFLEX) 500 MG Cap Take 1 Capsule by mouth 4 times a day for 7 days. 28 Capsule 0     No current facility-administered medications on file prior to visit.        Allergies:   Bactrim ds and Other environmental    Problem List:   There is no problem list on file for this patient.       Surgical History:  No past surgical history on file.    Past Social Hx:   Social History     Tobacco Use    Smoking status: Never    Smokeless tobacco: Never   Vaping Use    Vaping Use: Never used   Substance Use Topics    Alcohol use: No    Drug use: No          Problem list, medications, and allergies reviewed by myself today in Epic.     Objective:     /82   Pulse 71   Temp 37 °C (98.6 °F)   Resp 14   Ht 1.702 m (5' 7\")   Wt 60.3 kg (133 lb)   SpO2 99%   BMI 20.83 kg/m²     Physical Exam  Cardiovascular:      Rate and Rhythm: Normal rate and regular rhythm.      Heart sounds: Normal heart sounds.   Pulmonary:      Effort: Pulmonary effort is normal.      Breath sounds: Normal breath sounds.   Skin:     Findings: Rash present.      Comments: Bilateral axilla are erythematous with maculopapular lesions with sharply demarcated border. No central clearing, no vesicles, no scaling.          Assessment/Plan:     Diagnosis and associated orders:   1. Rash  - " clotrimazole-betamethasone (LOTRISONE) 1-0.05 % Cream; Apply 1 Application topically 2 times a day.  Dispense: 45 g; Refill: 0      Comments/MDM:     Wash axilla areas with unscented soap, pat dry, apply Lotrisone twice a day  Refrain from using deodorant, shaving until rash has resolved  Return to urgent care prn if new or worsening sx or if there is no improvement in condition prn.         Pt is clinically stable at today's acute urgent care visit.  No acute distress noted. Appropriate for outpatient management at this time.       Discussed DDx, management options (risks,benefits, and alternatives to planned treatment), natural progression and supportive care.  Expressed understanding and the treatment plan was agreed upon. Questions were encouraged and answered     Please note that this dictation was created using voice recognition software. I have made a reasonable attempt to correct obvious errors, but I expect that there are errors of grammar and possibly content that I did not discover before finalizing the note.    This note was electronically signed by Valentina Gil DNP

## 2022-11-25 ENCOUNTER — OFFICE VISIT (OUTPATIENT)
Dept: URGENT CARE | Facility: PHYSICIAN GROUP | Age: 32
End: 2022-11-25

## 2022-11-25 VITALS
SYSTOLIC BLOOD PRESSURE: 108 MMHG | OXYGEN SATURATION: 99 % | TEMPERATURE: 99 F | BODY MASS INDEX: 20.16 KG/M2 | HEIGHT: 68 IN | HEART RATE: 83 BPM | RESPIRATION RATE: 14 BRPM | WEIGHT: 133 LBS | DIASTOLIC BLOOD PRESSURE: 78 MMHG

## 2022-11-25 DIAGNOSIS — J02.0 STREP PHARYNGITIS: ICD-10-CM

## 2022-11-25 DIAGNOSIS — L30.9 DERMATITIS: ICD-10-CM

## 2022-11-25 PROBLEM — Z87.440 HISTORY OF URINARY TRACT INFECTION: Status: ACTIVE | Noted: 2019-04-18

## 2022-11-25 PROBLEM — Z87.442 HISTORY OF RENAL CALCULI: Status: ACTIVE | Noted: 2020-02-20

## 2022-11-25 LAB
INT CON NEG: NORMAL
INT CON POS: NORMAL
S PYO AG THROAT QL: POSITIVE

## 2022-11-25 PROCEDURE — 99213 OFFICE O/P EST LOW 20 MIN: CPT | Performed by: PHYSICIAN ASSISTANT

## 2022-11-25 PROCEDURE — 87880 STREP A ASSAY W/OPTIC: CPT | Performed by: PHYSICIAN ASSISTANT

## 2022-11-25 RX ORDER — AMOXICILLIN 500 MG/1
500 CAPSULE ORAL 2 TIMES DAILY
Qty: 20 CAPSULE | Refills: 0 | Status: SHIPPED | OUTPATIENT
Start: 2022-11-25 | End: 2022-12-05

## 2022-11-25 ASSESSMENT — ENCOUNTER SYMPTOMS
DIZZINESS: 0
HEADACHES: 0
CHILLS: 1
VOMITING: 0
FEVER: 0
SHORTNESS OF BREATH: 0
SORE THROAT: 1
NAUSEA: 0

## 2022-11-25 NOTE — PROGRESS NOTES
Subjective     Lucy Gonzalez is a 32 y.o. female who presents with Rash (Hx of rash, from hips to top of head. Flared-up x1week and is spreading. )      HPI:  Lucy Gonzalez is a 32 y.o. female who presents today for evaluation of a rash.  Patient was initially seen in the urgent care on 11/9/2022.  At that time she had had a rash in her bilateral axillary regions for approximately 1 month.  She had been trying OTC topical Benadryl and hydrocortisone cream which were initially helpful but then not so much.  In the urgent care she was prescribed cephalexin and Medrol Dosepak.  She was also treated with oral fluconazole as she tends to get vaginal yeast infections with antibiotics.  She returned to the urgent care on 11/23 stating that she felt as though the rash was spreading.  On exam she was found to have bilateral axilla with erythematous maculopapular lesions without any centralized clearing, vesicles, or scaling.  She was prescribed Lotrisone cream and was advised to refrain from using deodorant and shaving until the rash had resolved.  Patient reports that since her initial visit the rash has continued to worsen and spread and now she is getting it on her entire body.  Over the last few days she is also noted some mild URI symptoms, including sore throat.      Review of Systems   Constitutional:  Positive for chills. Negative for fever and malaise/fatigue.   HENT:  Positive for sore throat.    Respiratory:  Negative for shortness of breath.    Cardiovascular:  Negative for chest pain.   Gastrointestinal:  Negative for nausea and vomiting.   Musculoskeletal:  Negative for joint pain.   Skin:  Positive for itching and rash.   Neurological:  Negative for dizziness and headaches.         PMH:  has no past medical history of Asthma, Diabetes (HCC), Hemorrhagic disorder (HCC), Hypertension, or Stroke ([facility]).  MEDS:   Current Outpatient Medications:     methylPREDNISolone (MEDROL DOSEPAK) 4 MG Tablet Therapy  "Pack, Follow schedule on package instructions., Disp: 21 Tablet, Rfl: 0    cephALEXin (KEFLEX) 500 MG Cap, Take 1 Capsule by mouth 4 times a day for 7 days., Disp: 28 Capsule, Rfl: 0    clotrimazole-betamethasone (LOTRISONE) 1-0.05 % Cream, Apply 1 Application topically 2 times a day. (Patient not taking: Reported on 11/25/2022), Disp: 45 g, Rfl: 0  ALLERGIES:   Allergies   Allergen Reactions    Bactrim Ds     Other Environmental Rash     Cold weather causes rash     SURGHX: No past surgical history on file.  SOCHX:  reports that she has never smoked. She has never used smokeless tobacco. She reports that she does not drink alcohol and does not use drugs.  FH: Family history was reviewed, no pertinent findings to report      Objective     /78   Pulse 83   Temp 37.2 °C (99 °F)   Resp 14   Ht 1.715 m (5' 7.5\")   Wt 60.3 kg (133 lb)   SpO2 99%   BMI 20.52 kg/m²      Physical Exam  Constitutional:       Appearance: She is well-developed.   HENT:      Head: Normocephalic and atraumatic.      Right Ear: External ear normal.      Left Ear: External ear normal.      Mouth/Throat:      Pharynx: Oropharynx is clear. Uvula midline. Posterior oropharyngeal erythema present.      Tonsils: No tonsillar exudate or tonsillar abscesses. 1+ on the right. 1+ on the left.   Eyes:      Conjunctiva/sclera: Conjunctivae normal.      Pupils: Pupils are equal, round, and reactive to light.   Cardiovascular:      Rate and Rhythm: Normal rate and regular rhythm.      Heart sounds: Normal heart sounds. No murmur heard.  Pulmonary:      Effort: Pulmonary effort is normal.      Breath sounds: Normal breath sounds. No wheezing.   Musculoskeletal:      Cervical back: Normal range of motion.   Lymphadenopathy:      Cervical: Cervical adenopathy present.   Skin:     General: Skin is warm and dry.      Capillary Refill: Capillary refill takes less than 2 seconds.      Findings: Rash present.      Comments: Diffuse erythematous macular " and papular rash noted on entire body.  It is worse on the chest, neck, and axillary regions.  No vesicular lesions noted.  It blanches with pressure.  No signs of secondary bacterial skin infection.   Neurological:      Mental Status: She is alert and oriented to person, place, and time.   Psychiatric:         Behavior: Behavior normal.         Judgment: Judgment normal.        POCT Rapid Strep A - POSITIVE    Assessment & Plan     1. Strep pharyngitis  - POCT Rapid Strep A  - amoxicillin (AMOXIL) 500 MG Cap; Take 1 Capsule by mouth 2 times a day for 10 days.  Dispense: 20 Capsule; Refill: 0  -Supportive care discussed to include salt water gargles, throat lozenges, and increased fluid intake  - Tylenol or ibuprofen as needed for fever > 100.4 F  Discussed with patient that they are contagious until they been on the antibiotics for at least 24 hours.  Also recommend that they switch their toothbrush out after being on the antibiotics for 2 to 3 days.    2. Dermatitis  Patient should discontinue the Keflex that she was prescribed.  Also recommend discontinuing the Lotrisone cream as the rash does not have a fungal appearance at this time.  She should complete the steroid taper.  Recommend the use of an oral antihistamine such as Zyrtec or Claritin once or twice daily to help with the itching.  Avoid heat, cool compresses can help.  Patient has an appointment to establish care with primary care on 12/6/2022.  She can follow-up with them if she still has residual symptoms after completing the course of antibiotics.              Differential Diagnosis, natural history, and supportive care discussed. Return to the Urgent Care or follow up with your PCP if symptoms fail to resolve, or for any new or worsening symptoms. Emergency room precautions discussed. Patient and/or family appears understanding of information.

## 2022-11-25 NOTE — LETTER
November 25, 2022         Patient: Lucy Gonzalez   YOB: 1990   Date of Visit: 11/25/2022           To Whom it May Concern:    Lucy Gonzalez was seen in my clinic on 11/25/2022.  She is being treated for strep throat.  Please excuse any absences this week as it relates to this illness.  She may return to work on Monday, 11/28/2022.            Sincerely,           Josephine Caldera P.A.-C.  Electronically Signed

## 2022-11-28 ENCOUNTER — HOSPITAL ENCOUNTER (EMERGENCY)
Facility: MEDICAL CENTER | Age: 32
End: 2022-11-28
Attending: EMERGENCY MEDICINE

## 2022-11-28 VITALS
OXYGEN SATURATION: 90 % | HEART RATE: 73 BPM | RESPIRATION RATE: 16 BRPM | TEMPERATURE: 98.6 F | DIASTOLIC BLOOD PRESSURE: 79 MMHG | SYSTOLIC BLOOD PRESSURE: 124 MMHG | HEIGHT: 68 IN | BODY MASS INDEX: 20.11 KG/M2 | WEIGHT: 132.72 LBS

## 2022-11-28 DIAGNOSIS — L30.9 DERMATITIS: ICD-10-CM

## 2022-11-28 PROCEDURE — 700111 HCHG RX REV CODE 636 W/ 250 OVERRIDE (IP): Performed by: EMERGENCY MEDICINE

## 2022-11-28 PROCEDURE — A9270 NON-COVERED ITEM OR SERVICE: HCPCS | Performed by: EMERGENCY MEDICINE

## 2022-11-28 PROCEDURE — 99283 EMERGENCY DEPT VISIT LOW MDM: CPT

## 2022-11-28 PROCEDURE — 700102 HCHG RX REV CODE 250 W/ 637 OVERRIDE(OP): Performed by: EMERGENCY MEDICINE

## 2022-11-28 RX ORDER — FAMOTIDINE 20 MG/1
20 TABLET, FILM COATED ORAL ONCE
Status: COMPLETED | OUTPATIENT
Start: 2022-11-28 | End: 2022-11-28

## 2022-11-28 RX ORDER — CETIRIZINE HYDROCHLORIDE 10 MG/1
10 TABLET ORAL ONCE
Status: COMPLETED | OUTPATIENT
Start: 2022-11-28 | End: 2022-11-28

## 2022-11-28 RX ORDER — PREDNISONE 10 MG/1
20 TABLET ORAL ONCE
Status: COMPLETED | OUTPATIENT
Start: 2022-11-28 | End: 2022-11-28

## 2022-11-28 RX ORDER — PREDNISONE 20 MG/1
20 TABLET ORAL DAILY
Qty: 5 TABLET | Refills: 0 | Status: SHIPPED | OUTPATIENT
Start: 2022-11-28 | End: 2022-12-03

## 2022-11-28 RX ADMIN — PREDNISONE 20 MG: 10 TABLET ORAL at 23:02

## 2022-11-28 RX ADMIN — CETIRIZINE HYDROCHLORIDE TABLETS 10 MG: 10 TABLET, FILM COATED ORAL at 23:02

## 2022-11-28 RX ADMIN — FAMOTIDINE 20 MG: 20 TABLET ORAL at 23:02

## 2022-11-29 NOTE — ED TRIAGE NOTES
Chief Complaint   Patient presents with    Rash     Pt arrives to the ER via POV unaccompanied. Pt complains of rash started L arm and has spread all over her body, she developed the rash after getting a PPD skin test. Has tried multiple ointments, ABX, aloe, creams, benadryl. Onset > month.  Advised NPO until seen by provider. Pt verbalizes understanding.

## 2022-11-29 NOTE — ED PROVIDER NOTES
ED Provider Note    CHIEF COMPLAINT  Chief Complaint   Patient presents with    Rash     Pt arrives to the ER via POV unaccompanied. Pt complains of rash started L arm and has spread all over her body, she developed the rash after getting a P[employer] skin test. Has tried multiple ointments, ABX, aloe, creams, benadryl. Onset > month.  Advised NPO until seen by provider. Pt verbalizes understanding.   Pt is on ABX for Strep throat, she has a productive cough and sore throat. Pt has been on ABX for 3-4 days, she was seen [facility]. Rash has gotten worse since starting ABX.           HPI  Lucy Gonzalez is a 32 y.o. female who presents for persistent rash.  Past medical history reportedly benign.  Patient explains that she has not had a rash for nearly 1 month.  Recently after 3 visits at the local urgent care she was ultimately diagnosed with strep throat.  Now on roughly day 3 or 4 of her antibiotics.  She continues to have rash diffusely to her entire body to include arms, face and abdomen as well as legs.    She explains that over the last month she has used multiple medications including Benadryl as well as a steroid taper as was also previously prescribed by the urgent care.  She also notes that she has used oatmeal baths and multiple essential oils to no relief.    REVIEW OF SYSTEMS  See HPI for further details. All other systems are negative.     PAST MEDICAL HISTORY       SOCIAL HISTORY  Social History     Tobacco Use    Smoking status: Former     Types: Cigarettes    Smokeless tobacco: Never   Vaping Use    Vaping Use: Never used   Substance and Sexual Activity    Alcohol use: No    Drug use: No    Sexual activity: Not on file       SURGICAL HISTORY  patient denies any surgical history    CURRENT MEDICATIONS  Home Medications       Reviewed by Ladi Munoz R.N. (Registered Nurse) on 11/28/22 at 2206  Med List Status: Complete     Medication Last Dose Status   amoxicillin (AMOXIL) 500 MG Cap 11/28/2022 Active  "  diphenhydramine (BENADRYL) 2 % Cream 11/27/2022 Active                    ALLERGIES  Allergies   Allergen Reactions    Bactrim Ds     Other Environmental Rash     Cold weather causes rash       PHYSICAL EXAM  VITAL SIGNS: /79   Pulse 73   Temp 37 °C (98.6 °F) (Temporal)   Resp 16   Ht 1.715 m (5' 7.5\")   Wt 60.2 kg (132 lb 11.5 oz)   LMP 11/14/2022 (Approximate)   SpO2 90%   BMI 20.48 kg/m²  @VITALIY[[id number]::@  Pulse ox interpretation: I interpret this pulse ox as normal.  Constitutional: Alert in no apparent distress.  HENT: Normocephalic, Atraumatic, Bilateral external ears normal. Nose normal.   Eyes: Pupils are equal and reactive.   Heart: Regular rate and rythm, no murmurs.    Lungs: Clear to auscultation bilaterally.  Skin: Warm, Dry.  Diffuse erythematous rash with maculopapular appearance.  No diffuse raised patches or urticarial lesions.  Neurologic: Alert, Grossly non-focal.   Psychiatric: Affect normal, Judgment normal, Mood normal, Appears appropriate and not intoxicated.           COURSE & MEDICAL DECISION MAKING  Pertinent Labs & Imaging studies reviewed. (See chart for details)  32-year-old female presented emerged department for rash.  History as above.  Primary causation of her diffuse dermatitis at this point is unknown.  Possible allergic.  Underlying systemic disease also possible.  We will follow-up with PCP and dermatology as discussed at bedside.  She has been given a medication regimen to follow to see if this does not improve her overall symptoms.  The patient will return for worsening symptoms and is stable at the time of discharge. The patient verbalizes understanding and will comply.    FINAL IMPRESSION  1. Dermatitis               Electronically signed by: Pedro Luis Freitas M.D., 11/28/2022 10:58 PM        "

## 2022-12-06 ENCOUNTER — APPOINTMENT (OUTPATIENT)
Dept: MEDICAL GROUP | Facility: PHYSICIAN GROUP | Age: 32
End: 2022-12-06
Attending: NURSE PRACTITIONER

## 2024-04-26 NOTE — PATIENT INSTRUCTIONS
Urinary Tract Infection, Adult  Introduction  A urinary tract infection (UTI) is an infection of any part of the urinary tract. The urinary tract includes the:  · Kidneys.  · Ureters.  · Bladder.  · Urethra.  These organs make, store, and get rid of pee (urine) in the body.  Follow these instructions at home:  · Take over-the-counter and prescription medicines only as told by your doctor.  · If you were prescribed an antibiotic medicine, take it as told by your doctor. Do not stop taking the antibiotic even if you start to feel better.  · Avoid the following drinks:  ¨ Alcohol.  ¨ Caffeine.  ¨ Tea.  ¨ Carbonated drinks.  · Drink enough fluid to keep your pee clear or pale yellow.  · Keep all follow-up visits as told by your doctor. This is important.  · Make sure to:  ¨ Empty your bladder often and completely. Do not to hold pee for long periods of time.  ¨ Empty your bladder before and after sex.  ¨ Wipe from front to back after a bowel movement if you are female. Use each tissue one time when you wipe.  Contact a doctor if:  · You have back pain.  · You have a fever.  · You feel sick to your stomach (nauseous).  · You throw up (vomit).  · Your symptoms do not get better after 3 days.  · Your symptoms go away and then come back.  Get help right away if:  · You have very bad back pain.  · You have very bad lower belly (abdominal) pain.  · You are throwing up and cannot keep down any medicines or water.  This information is not intended to replace advice given to you by your health care provider. Make sure you discuss any questions you have with your health care provider.  Document Released: 06/05/2009 Document Revised: 05/25/2017 Document Reviewed: 11/07/2016  © 2017 [employer]    
30.5

## 2024-10-16 ENCOUNTER — HOSPITAL ENCOUNTER (OUTPATIENT)
Facility: MEDICAL CENTER | Age: 34
End: 2024-10-16
Attending: NURSE PRACTITIONER
Payer: MEDICAID

## 2024-10-16 ENCOUNTER — OFFICE VISIT (OUTPATIENT)
Dept: URGENT CARE | Facility: CLINIC | Age: 34
End: 2024-10-16
Payer: MEDICAID

## 2024-10-16 VITALS
DIASTOLIC BLOOD PRESSURE: 70 MMHG | RESPIRATION RATE: 14 BRPM | HEART RATE: 84 BPM | OXYGEN SATURATION: 100 % | HEIGHT: 67 IN | TEMPERATURE: 98 F | WEIGHT: 136 LBS | SYSTOLIC BLOOD PRESSURE: 112 MMHG | BODY MASS INDEX: 21.35 KG/M2

## 2024-10-16 DIAGNOSIS — N89.8 VAGINAL DISCHARGE: ICD-10-CM

## 2024-10-16 DIAGNOSIS — J02.9 PHARYNGITIS, UNSPECIFIED ETIOLOGY: ICD-10-CM

## 2024-10-16 DIAGNOSIS — B96.89 BV (BACTERIAL VAGINOSIS): ICD-10-CM

## 2024-10-16 DIAGNOSIS — N76.0 BV (BACTERIAL VAGINOSIS): ICD-10-CM

## 2024-10-16 LAB
APPEARANCE UR: CLEAR
BILIRUB UR STRIP-MCNC: NEGATIVE MG/DL
COLOR UR AUTO: YELLOW
GLUCOSE UR STRIP.AUTO-MCNC: NEGATIVE MG/DL
KETONES UR STRIP.AUTO-MCNC: NEGATIVE MG/DL
LEUKOCYTE ESTERASE UR QL STRIP.AUTO: NEGATIVE
NITRITE UR QL STRIP.AUTO: NEGATIVE
PH UR STRIP.AUTO: 6 [PH] (ref 5–8)
POCT INT CON NEG: NEGATIVE
POCT INT CON POS: POSITIVE
POCT URINE PREGNANCY TEST: NEGATIVE
PROT UR QL STRIP: NEGATIVE MG/DL
RBC UR QL AUTO: NEGATIVE
S PYO DNA SPEC NAA+PROBE: NOT DETECTED
SP GR UR STRIP.AUTO: 1.02
UROBILINOGEN UR STRIP-MCNC: NORMAL MG/DL

## 2024-10-16 PROCEDURE — 81025 URINE PREGNANCY TEST: CPT | Performed by: NURSE PRACTITIONER

## 2024-10-16 PROCEDURE — 87651 STREP A DNA AMP PROBE: CPT | Performed by: NURSE PRACTITIONER

## 2024-10-16 PROCEDURE — 87480 CANDIDA DNA DIR PROBE: CPT

## 2024-10-16 PROCEDURE — 87591 N.GONORRHOEAE DNA AMP PROB: CPT

## 2024-10-16 PROCEDURE — 3074F SYST BP LT 130 MM HG: CPT | Performed by: NURSE PRACTITIONER

## 2024-10-16 PROCEDURE — 81002 URINALYSIS NONAUTO W/O SCOPE: CPT | Performed by: NURSE PRACTITIONER

## 2024-10-16 PROCEDURE — 87070 CULTURE OTHR SPECIMN AEROBIC: CPT

## 2024-10-16 PROCEDURE — 87510 GARDNER VAG DNA DIR PROBE: CPT

## 2024-10-16 PROCEDURE — 3078F DIAST BP <80 MM HG: CPT | Performed by: NURSE PRACTITIONER

## 2024-10-16 PROCEDURE — 87660 TRICHOMONAS VAGIN DIR PROBE: CPT

## 2024-10-16 PROCEDURE — 99214 OFFICE O/P EST MOD 30 MIN: CPT | Performed by: NURSE PRACTITIONER

## 2024-10-16 PROCEDURE — 87491 CHLMYD TRACH DNA AMP PROBE: CPT

## 2024-10-17 LAB
C TRACH DNA GENITAL QL NAA+PROBE: NEGATIVE
CANDIDA DNA VAG QL PROBE+SIG AMP: NEGATIVE
G VAGINALIS DNA VAG QL PROBE+SIG AMP: POSITIVE
N GONORRHOEA DNA GENITAL QL NAA+PROBE: NEGATIVE
SPECIMEN SOURCE: NORMAL
T VAGINALIS DNA VAG QL PROBE+SIG AMP: NEGATIVE

## 2024-10-17 RX ORDER — METRONIDAZOLE 500 MG/1
500 TABLET ORAL 2 TIMES DAILY
Qty: 14 TABLET | Refills: 0 | Status: SHIPPED | OUTPATIENT
Start: 2024-10-17 | End: 2024-10-24

## 2024-10-17 ASSESSMENT — ENCOUNTER SYMPTOMS
FEVER: 0
COUGH: 0
FLANK PAIN: 0
SORE THROAT: 1
HOARSE VOICE: 0
FATIGUE: 0
SWOLLEN GLANDS: 0
TROUBLE SWALLOWING: 0

## 2024-10-19 LAB
BACTERIA SPEC RESP CULT: NORMAL
SIGNIFICANT IND 70042: NORMAL
SITE SITE: NORMAL
SOURCE SOURCE: NORMAL

## 2024-10-29 ENCOUNTER — OFFICE VISIT (OUTPATIENT)
Dept: MEDICAL GROUP | Facility: CLINIC | Age: 34
End: 2024-10-29
Payer: MEDICAID

## 2024-10-29 VITALS
DIASTOLIC BLOOD PRESSURE: 59 MMHG | HEART RATE: 74 BPM | HEIGHT: 68 IN | OXYGEN SATURATION: 96 % | RESPIRATION RATE: 18 BRPM | BODY MASS INDEX: 20.9 KG/M2 | TEMPERATURE: 97.5 F | WEIGHT: 137.9 LBS | SYSTOLIC BLOOD PRESSURE: 94 MMHG

## 2024-10-29 DIAGNOSIS — F33.1 MODERATE EPISODE OF RECURRENT MAJOR DEPRESSIVE DISORDER (HCC): ICD-10-CM

## 2024-10-29 DIAGNOSIS — Z13.228 SCREENING FOR METABOLIC DISORDER: ICD-10-CM

## 2024-10-29 PROBLEM — F32.9 MAJOR DEPRESSIVE DISORDER: Status: ACTIVE | Noted: 2024-10-29

## 2024-10-29 PROCEDURE — 99213 OFFICE O/P EST LOW 20 MIN: CPT

## 2024-10-29 PROCEDURE — 3078F DIAST BP <80 MM HG: CPT

## 2024-10-29 PROCEDURE — 3074F SYST BP LT 130 MM HG: CPT

## 2024-10-29 ASSESSMENT — PATIENT HEALTH QUESTIONNAIRE - PHQ9
SUM OF ALL RESPONSES TO PHQ QUESTIONS 1-9: 12
CLINICAL INTERPRETATION OF PHQ2 SCORE: 2
5. POOR APPETITE OR OVEREATING: 3 - NEARLY EVERY DAY

## 2024-11-15 ENCOUNTER — HOSPITAL ENCOUNTER (OUTPATIENT)
Dept: LAB | Facility: MEDICAL CENTER | Age: 34
End: 2024-11-15
Payer: MEDICAID

## 2024-11-15 DIAGNOSIS — Z13.228 SCREENING FOR METABOLIC DISORDER: ICD-10-CM

## 2024-11-15 LAB
ALBUMIN SERPL BCP-MCNC: 4.5 G/DL (ref 3.2–4.9)
ALBUMIN/GLOB SERPL: 1.6 G/DL
ALP SERPL-CCNC: 47 U/L (ref 30–99)
ALT SERPL-CCNC: 8 U/L (ref 2–50)
ANION GAP SERPL CALC-SCNC: 11 MMOL/L (ref 7–16)
AST SERPL-CCNC: 15 U/L (ref 12–45)
BILIRUB SERPL-MCNC: 1.1 MG/DL (ref 0.1–1.5)
BUN SERPL-MCNC: 14 MG/DL (ref 8–22)
CALCIUM ALBUM COR SERPL-MCNC: 8.6 MG/DL (ref 8.5–10.5)
CALCIUM SERPL-MCNC: 9 MG/DL (ref 8.5–10.5)
CHLORIDE SERPL-SCNC: 104 MMOL/L (ref 96–112)
CHOLEST SERPL-MCNC: 128 MG/DL (ref 100–199)
CO2 SERPL-SCNC: 23 MMOL/L (ref 20–33)
CREAT SERPL-MCNC: 0.77 MG/DL (ref 0.5–1.4)
FASTING STATUS PATIENT QL REPORTED: NORMAL
GFR SERPLBLD CREATININE-BSD FMLA CKD-EPI: 104 ML/MIN/1.73 M 2
GLOBULIN SER CALC-MCNC: 2.9 G/DL (ref 1.9–3.5)
GLUCOSE SERPL-MCNC: 86 MG/DL (ref 65–99)
HDLC SERPL-MCNC: 45 MG/DL
LDLC SERPL CALC-MCNC: 72 MG/DL
POTASSIUM SERPL-SCNC: 4.4 MMOL/L (ref 3.6–5.5)
PROT SERPL-MCNC: 7.4 G/DL (ref 6–8.2)
SODIUM SERPL-SCNC: 138 MMOL/L (ref 135–145)
TRIGL SERPL-MCNC: 57 MG/DL (ref 0–149)
TSH SERPL DL<=0.005 MIU/L-ACNC: 1.62 UIU/ML (ref 0.38–5.33)

## 2024-11-15 PROCEDURE — 36415 COLL VENOUS BLD VENIPUNCTURE: CPT

## 2024-11-15 PROCEDURE — 84443 ASSAY THYROID STIM HORMONE: CPT

## 2024-11-15 PROCEDURE — 80053 COMPREHEN METABOLIC PANEL: CPT

## 2024-11-15 PROCEDURE — 80061 LIPID PANEL: CPT
